# Patient Record
Sex: MALE | Race: BLACK OR AFRICAN AMERICAN | NOT HISPANIC OR LATINO | Employment: OTHER | URBAN - METROPOLITAN AREA
[De-identification: names, ages, dates, MRNs, and addresses within clinical notes are randomized per-mention and may not be internally consistent; named-entity substitution may affect disease eponyms.]

---

## 2017-07-12 ENCOUNTER — OFFICE VISIT (OUTPATIENT)
Dept: CARDIOLOGY | Facility: CLINIC | Age: 62
End: 2017-07-12
Payer: MEDICAID

## 2017-07-12 VITALS
HEIGHT: 72 IN | BODY MASS INDEX: 21.81 KG/M2 | HEART RATE: 76 BPM | WEIGHT: 161 LBS | SYSTOLIC BLOOD PRESSURE: 140 MMHG | DIASTOLIC BLOOD PRESSURE: 80 MMHG

## 2017-07-12 DIAGNOSIS — I50.32 CHRONIC DIASTOLIC HEART FAILURE: ICD-10-CM

## 2017-07-12 DIAGNOSIS — R09.89 BILATERAL CAROTID BRUITS: ICD-10-CM

## 2017-07-12 DIAGNOSIS — R26.9 ABNORMALITY OF GAIT FOLLOWING CEREBROVASCULAR ACCIDENT (CVA): ICD-10-CM

## 2017-07-12 DIAGNOSIS — I10 ESSENTIAL HYPERTENSION: ICD-10-CM

## 2017-07-12 DIAGNOSIS — I77.1 SUBCLAVIAN ARTERY STENOSIS, LEFT: Primary | ICD-10-CM

## 2017-07-12 DIAGNOSIS — I69.398 ABNORMALITY OF GAIT FOLLOWING CEREBROVASCULAR ACCIDENT (CVA): ICD-10-CM

## 2017-07-12 DIAGNOSIS — I25.10 CORONARY ARTERY DISEASE INVOLVING NATIVE CORONARY ARTERY OF NATIVE HEART WITHOUT ANGINA PECTORIS: ICD-10-CM

## 2017-07-12 DIAGNOSIS — I21.4 NSTEMI (NON-ST ELEVATED MYOCARDIAL INFARCTION): ICD-10-CM

## 2017-07-12 PROCEDURE — 99999 PR PBB SHADOW E&M-EST. PATIENT-LVL II: CPT | Mod: PBBFAC,,, | Performed by: INTERNAL MEDICINE

## 2017-07-12 PROCEDURE — 99214 OFFICE O/P EST MOD 30 MIN: CPT | Mod: S$PBB,,, | Performed by: INTERNAL MEDICINE

## 2017-07-12 PROCEDURE — 99212 OFFICE O/P EST SF 10 MIN: CPT | Mod: PBBFAC,PO | Performed by: INTERNAL MEDICINE

## 2017-07-12 NOTE — PATIENT INSTRUCTIONS
Heart Disease Education    The heart beats 60 to 100 times per minute, 24 hours a day. This equals almost 1000,000 times a day. It pumps blood with oxygen and nutrients to the tissues and organs of the body. But the heart is a muscle and needs its own supply of blood. Blood flow to the heart is supplied by the coronary arteries. Coronary artery disease (atherosclerosis) is a result of cholesterol, saturated fat, and calcium deposits (plaques) that build up inside the walls. This causes inflammation within the coronary arteries. These plaques narrow the artery and reduce blood flow to the heart muscle. The reduction in blood flow to the heart muscle decreases oxygen supply to the heart. If the narrowing is significant enough, the oxygen supply to one or more regions of the heart can be temporarily or permanently shut down. This can cause chest pain, and possibly death of heart tissue (heart attack).  Types of chest pain  Angina is the name for pain in the heart muscle. Angina is a warning sign of serious heart disease. When untreated it can lead to a heart attack, also known as acute myocardial infarction, or AMI. Angina occurs when there is not enough blood and oxygen flowing to the heart for the amount of work it is doing. This most often happens during physical exertion, when the heart is working hardest. It is usually relieved by rest or nitroglycerin. Angina may also occur after a large meal when extra blood is sent to the digestive organs and less goes to the heart. In the case of advanced or unstable heart disease, angina can occur at rest or awaken you from sleep. Angina usually lasts from a few minutes up to 20 minutes or more. When treated early, the effects of angina can be reversed without permanent damage to the heart. Angina is a serious condition and needs to be evaluated by a medical professional immediately.  There are two types of angina -- stable and unstable:  · Stable angina usually occurs  with a predictable level of activity. Being stable, its character, severity, and occurrence do not change much over time. It usually starts with activity, and resolves with rest or taking your medicine as instructed by your doctor. The symptoms usually do not last long.  · Unstable angina changes or gets worse over time. It is different from whatever you are used to. It may feel different or worse, begin without cause, occur with exercise or exertion, wake you up from sleep, and last longer. It may not respond in the same way as it does when you take your usual medicines for an attack. This type of angina can be a warning sign of an impending heart attack.     A heart attack is usually the result of a blood clot that suddenly forms in a coronary artery that has been narrowed with plaque. When this occurs, blood flow may be cut off to a part of the heart muscle, causing the cells to die. This weakens the pumping action of the heart, which affects the delivery of blood to all the other organs in the body including the brain. This damage is not reversible. However, early treatment can limit the amount of damage.  The pain you feel with angina and a heart attack may have a similar quality. However, it is usually different in intensity and duration. Here are some typical descriptions of a heart attack:  · It is most often experienced as a squeezing, crushing, pressure-like sensation in the center of the chest.  · It is sometimes described as something heavy sitting on my chest.  · It may feel more like a bad case of indigestion.  · The pain may spread from the chest to the arm, shoulder, throat or jaw.  · Sometimes the pain is not felt in the chest at all, but only in the arm, shoulder, throat or jaw.  · There may also be nausea, vomiting, dizziness or light-headedness, sweating and trouble breathing.  · Palpitations, or your heart beating rapidly  · A new, irregular heart beat  · Unexplained weakness  You may not be  "able to tell the difference between "bad" angina and a heart attack at home. Seek help if your symptoms are different than usual. Do not be in denial or just try to "tough it out."  Call 911  This is the fastest and safest way to get to the emergency department. The paramedics can also start treatment on the way to the hospital, saving valuable time for your heart.  · If the angina gets worse, if it continues, or if it stops and returns, call 911 immediately. Do not delay. You may be having a heart attack.  · After you call 911, take a second tablet or spray unless instructed otherwise. When repeating doses, sit down if possible, because it can make you feel lightheaded or dizzy. Wait another 5 minutes. If the angina still does not go away, take a third tablet or spray. Do not take more than 3 tablets or sprays within 15 minutes. Stay on the phone with 911 for further instruction.  · Your healthcare provider may give you slightly different instructions than those above. If so, follow them carefully.  Do not wait until symptoms become severe to call 911.  Other reasons to call 911 include:  · Trouble breathing  · Feeling lightheaded, faint, or dizzy  · Rapid heart beat  · Slower than usual heart rate compared to your normal  · Angina with weakness, dizziness, fainting, heavy sweating, nausea, or vomiting  · Extreme drowsiness, confusion  · Weakness of an arm or leg or one side of the face  · Difficulty with speech or vision  When to seek medical care  Remember, the signs and symptoms of a heart attack are not always like they are on TV. Sometimes they are not so obvious. You may only feel weak, or just not right. If it is not clear or if you have any doubt, call for advice.  · Seek help if there is a change in the type of pain, if it feels different, or if your symptoms are mild.  · Do not drive yourself. Have someone else drive you. If no one can drive, call 911.  · Do not delay. Fast diagnosis and treatment can " "prevent or limit the amount of heart damage during a heart attack.  · Do not go to your doctor's office or a clinic as they may not be able to provide all the testing and treatment required for this condition.  · If your doctor has given you medicine to take when symptoms occur, take them but don't delay getting help trying to locate medicines.  What happens in the emergency department  The emergency department is connected to your local emergency medical system (EMS) through 911. That's why during a cardiac emergency, calling 911 is the fastest way to get help. The goal of the emergency department is to rapidly screen, evaluate, and treat people.  Once you are there, an electrocardiogram (ECG or heart tracing) will be done. Blood samples may be taken to look for the presence of heart enzymes that leak from damaged heart cells and show if a heart attack is occurring. You will often be evaluated by a heart specialist (cardiologist) who decides the best course of action. In the case of severe angina or early heart attack, and depending on the circumstances, powerful "clot busting" medicines can be used to dissolve blood clots in the coronary artery. In other cases, you may be taken to a cardiac catheterization lab. Here, a tiny balloon-tipped catheter is advanced through blood vessels to the heart. There the balloon is inflated pushing open the blood vessel restoring blood flow.  Risk factors for heart disease  Risk factors for heart disease are a combination of genetic and lifestyle. Many risk factors work by either directly or indirectly damaging the blood vessels of the heart, or by increasing the risk of forming blood or cholesterol clots, which then clog up and block the arteries.     Examples of physical lifestyle risk factors:  · Cigarette smoking  · High blood pressure  · High blood cholesterol  · Use of stimulant drugs such as cocaine, crack, and amphetamines  · Eating a high-fat, high-cholesterol " meal  · Diabetes   · Obesity which increases risk for diabetes and high blood pressure  · Lack of regular physical activity     Examples of emotional lifestyle factors:  · Chronic high stress levels release stress hormones. These raise blood pressure and cholesterol level and makes blood clot more easily.  · Held-in anger, hostile or cynical attitude  · Social and emotional isolation, lack of intimacy  · Loss of relationship  · Depression  Other factors that increase the risk of heart attack that you cannot control :  · Age. The older you get beyond 40, the greater is your risk of significant coronary artery disease.  · Gender. More men than women get heart disease; but once past menopause, women who are not taking estrogen replacement have the same risk as men for a heart attack.  · Family history. If your mother, father, brother or sister has coronary artery disease, your risk of having it is higher than a person your age without this family history.  What can you do to decrease your risk  To reduce your risk of heart disease:  · Get regular checkups with your doctor.  · Take your medicines for blood pressure, cholesterol or diabetes as directed.  · Watch your diet. Eat a heart healthy diet choosing fresh foods, less salt, cholesterol, and fat  · Stop smoking. Get help if needed.  · Get regular exercise.  · Manage stress.  · Carry a list of medicines and doses in your wallet.  Date Last Reviewed: 12/30/2015  © 5363-4508 [employer]. [address] [place] [address], [place] [address]. All rights reserved. This information is not intended as a substitute for professional medical care. Always follow your healthcare professional's instructions.

## 2017-07-12 NOTE — PROGRESS NOTES
Subjective:   Patient ID:  Pee Bocanegra is a 62 y.o. male who presents for follow up of Coronary Artery Disease; Hyperlipidemia; Hypertension; and Shortness of Breath      HPI:       He is today feeling well but continue to have intermittent dyspnea with exertion. No weight gain. Sometimes his symptoms improve after taking an extra dose of lasix 20 mg. His last admission for CHF decompensation was 11/2019/2016 for dietary indiscretion. He is on aspirin and plavix for DAPT. He has L subclavian stenosis with 20-30 mmHg gradient without any symptoms. He has bilateral SFA and AT CTOs without claudication. 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 He walks with a cane. No ulcers.             cath report:        LM patent, LAD patent, D1 diffusely dz, prox & mid LCX 95% , mid 95% , and RCA .    SVG-OM with mid 95%, SVG-D1 occluded, and SVG-PDA occluded.    EF 45% with inferior wall hypokinesis and LVEDP 40 mmHg.    PCI of proximal LCX with 3.5 x 18 mm Resolute and mid LCX with 2.75 x 30 mm Resolute CAMILA.    No intervention of SVG-OM as it supplies a small territory.    The native LCX provides flow to the territory distal to SVG-OM anastomosis.             Patient Active Problem List    Diagnosis Date Noted    Bilateral carotid bruits 07/12/2017    Subclavian artery stenosis, left 11/17/2016         30 mmHg gradient   asymptomatic      Chronic diastolic heart failure 11/04/2016     -TTE (11/3/2016) Low normal to mildly depressed LVEF 50-55%; diastolic dysfunction; moderate LAE; mild AI/MR      PAD (peripheral artery disease) 11/04/2016         Bilateral SFA       NSTEMI (non-ST elevated myocardial infarction) 11/03/2016    Coronary artery disease involving native coronary artery of native heart without angina pectoris 11/03/2016     -NSTEMI  -[facility] (11/3/2016) LVEDP 40 mmHg; LVEF 50% mild  Hypokinesis to inferior wall; LM LIs; LAD LIs; pLCX 95%; mLCX 95%; RCA ; SVG-OM1 95%;  SVG-D1 occluded; SVG-RCA occluded   pLCX 3.5x18 Resolute (CAMILA) and mLCS 2.75 x30 Resolute (CAMILA)                              Essential hypertension 2016    Other and unspecified hyperlipidemia 2016    Tobacco abuse, in remission 2016         Quit 2016      Muscle weakness of lower extremity 2016    Hamstring tightness of both lower extremities 2016    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 2016    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   HENT: Negative for congestion.    Eyes: Negative for blurred vision, discharge and double vision.   Cardiovascular: Negative for chest pain, claudication, cyanosis, dyspnea on exertion, irregular heartbeat, leg swelling, near-syncope, orthopnea, palpitations, paroxysmal nocturnal dyspnea and syncope.   Respiratory: Negative for cough, shortness of breath and wheezing.    Endocrine: Negative for cold intolerance, heat intolerance and polyphagia.   Hematologic/Lymphatic: Negative for adenopathy and bleeding problem. Does not bruise/bleed easily.   Skin: Negative for dry skin and nail changes.   Musculoskeletal: Negative for arthritis, back pain, falls, joint pain, myalgias and neck pain.   Gastrointestinal: Negative for bloating, abdominal pain, change in bowel habit and constipation.   Genitourinary: Negative for bladder incontinence, dysuria, flank pain,  genital sores and missed menses.   Neurological: Negative for aphonia, brief paralysis, difficulty with concentration and dizziness.   Psychiatric/Behavioral: Negative for altered mental status and memory loss. The patient does not have insomnia.    Allergic/Immunologic: Negative for environmental allergies.       Objective:   Physical Exam   Constitutional: He is oriented to person, place, and time. He appears well-developed and well-nourished. He is not intubated.   HENT:   Head: Normocephalic and atraumatic.   Right Ear: External ear normal.   Left Ear: External ear normal.   Mouth/Throat: Oropharynx is clear and moist.   Eyes: Conjunctivae and EOM are normal. Pupils are equal, round, and reactive to light. Right eye exhibits no discharge. Left eye exhibits no discharge. No scleral icterus.   Neck: Normal range of motion. Neck supple. Normal carotid pulses, no hepatojugular reflux and no JVD present. Carotid bruit is not present. No tracheal deviation present. No thyromegaly present.   Cardiovascular: Normal rate, regular rhythm, S1 normal and S2 normal.   No extrasystoles are present. PMI is not displaced.  Exam reveals no gallop, no S3, no distant heart sounds, no friction rub and no midsystolic click.    No murmur heard.  Pulses:       Carotid pulses are 2+ on the right side, and 2+ on the left side.       Radial pulses are 2+ on the right side, and 2+ on the left side.        Femoral pulses are 2+ on the right side, and 2+ on the left side.       Popliteal pulses are 2+ on the right side, and 2+ on the left side.        Dorsalis pedis pulses are 0 on the right side, and 0 on the left side.        Posterior tibial pulses are 0 on the right side, and 0 on the left side.     Monophasic bilateral DP doppler signals  Triphasic L PT and Biphasic R PT doppler signals    Pulmonary/Chest: Effort normal and breath sounds normal. No accessory muscle usage or stridor. No apnea, no tachypnea and no bradypnea. He is not  intubated. No respiratory distress. He has no decreased breath sounds. He has no wheezes. He has no rales. He exhibits no tenderness and no bony tenderness.   Abdominal: He exhibits no distension, no pulsatile liver, no abdominal bruit, no ascites, no pulsatile midline mass and no mass. There is no tenderness. There is no rebound and no guarding.   Musculoskeletal: Normal range of motion. He exhibits no edema or tenderness.   Lymphadenopathy:     He has no cervical adenopathy.   Neurological: He is alert and oriented to person, place, and time. He has normal reflexes. No cranial nerve deficit. Coordination normal.   Chronic L sided weakness   Skin: Skin is warm. No rash noted. No erythema. No pallor.   Psychiatric: He has a normal mood and affect. His behavior is normal. Judgment and thought content normal.       Assessment:     1. Subclavian artery stenosis, left    2. NSTEMI (non-ST elevated myocardial infarction)    3. Coronary artery disease involving native coronary artery of native heart without angina pectoris    4. Essential hypertension    5. Chronic diastolic heart failure    6. 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 Bilateral carotid bruits        Plan:         Continue with current medical plan and lifestyle changes.  Return sooner for concerns or questions. If symptoms persist go to the ED  I have reviewed all pertinent data on this patient       Lasix 20 mg po daily   Extra dose of lasix for 2 lbs weight gain      Enroll in CHF monitoring program      Low salt diet  1.5 L fluid restriction        Consider increasing CHF medications or even adding aldactone for refractory symptoms  Rule out ischemia with PET stress in view of RCA         Follow up as scheduled. Return sooner for concerns or questions            He expressed verbal understanding and agreed with the plan        Patient's Medications   New Prescriptions    No medications on file   Previous Medications     ACETAMINOPHEN-CODEINE 300-30MG (TYLENOL #3) 300-30 MG TAB    Take by mouth.    ASPIRIN (ECOTRIN) 81 MG EC TABLET    Take 1 tablet (81 mg total) by mouth once daily.    ATORVASTATIN (LIPITOR) 80 MG TABLET    Take 1 tablet (80 mg total) by mouth once daily.    CARVEDILOL (COREG) 12.5 MG TABLET    Take 12.5 mg by mouth once daily.    CLOPIDOGREL (PLAVIX) 75 MG TABLET    Take 1 tablet (75 mg total) by mouth once daily.    FUROSEMIDE (LASIX) 20 MG TABLET    Take 1 tablet (20 mg total) by mouth 2 (two) times daily.    LISINOPRIL 10 MG TABLET    Take 1 tablet (10 mg total) by mouth once daily.    NITROGLYCERIN (NITROSTAT) 0.4 MG SL TABLET    Place 1 tablet (0.4 mg total) under the tongue every 5 (five) minutes as needed for Chest pain.    TAMSULOSIN (FLOMAX) 0.4 MG CP24    Take 0.4 mg by mouth once daily.   Modified Medications    No medications on file   Discontinued Medications    No medications on file

## 2017-07-25 ENCOUNTER — HOSPITAL ENCOUNTER (EMERGENCY)
Facility: HOSPITAL | Age: 62
Discharge: HOME | End: 2017-07-25
Attending: EMERGENCY MEDICINE
Payer: MEDICAID

## 2017-07-25 VITALS
DIASTOLIC BLOOD PRESSURE: 85 MMHG | OXYGEN SATURATION: 97 % | HEIGHT: 72 IN | WEIGHT: 161 LBS | SYSTOLIC BLOOD PRESSURE: 139 MMHG | TEMPERATURE: 98 F | RESPIRATION RATE: 20 BRPM | HEART RATE: 82 BPM | BODY MASS INDEX: 21.81 KG/M2

## 2017-07-25 DIAGNOSIS — L72.3 INFECTED SEBACEOUS CYST: Primary | ICD-10-CM

## 2017-07-25 DIAGNOSIS — L08.9 INFECTED SEBACEOUS CYST: Primary | ICD-10-CM

## 2017-07-25 DIAGNOSIS — L03.317 CELLULITIS OF BUTTOCK, LEFT: ICD-10-CM

## 2017-07-25 PROCEDURE — 10060 I&D ABSCESS SIMPLE/SINGLE: CPT

## 2017-07-25 PROCEDURE — 25000003 PHARM REV CODE 250: Performed by: EMERGENCY MEDICINE

## 2017-07-25 PROCEDURE — 96372 THER/PROPH/DIAG INJ SC/IM: CPT

## 2017-07-25 PROCEDURE — S0077 INJECTION, CLINDAMYCIN PHOSP: HCPCS | Performed by: EMERGENCY MEDICINE

## 2017-07-25 PROCEDURE — 99283 EMERGENCY DEPT VISIT LOW MDM: CPT | Mod: 25

## 2017-07-25 PROCEDURE — 25000003 PHARM REV CODE 250

## 2017-07-25 RX ORDER — CLINDAMYCIN HYDROCHLORIDE 300 MG/1
300 CAPSULE ORAL 4 TIMES DAILY
Qty: 40 CAPSULE | Refills: 0 | Status: SHIPPED | OUTPATIENT
Start: 2017-07-25 | End: 2017-08-04

## 2017-07-25 RX ORDER — LIDOCAINE HYDROCHLORIDE AND EPINEPHRINE 20; 10 MG/ML; UG/ML
1 INJECTION, SOLUTION INFILTRATION; PERINEURAL
Status: COMPLETED | OUTPATIENT
Start: 2017-07-25 | End: 2017-07-25

## 2017-07-25 RX ORDER — SULFAMETHOXAZOLE AND TRIMETHOPRIM 800; 160 MG/1; MG/1
1 TABLET ORAL 2 TIMES DAILY
Qty: 20 TABLET | Refills: 0 | Status: SHIPPED | OUTPATIENT
Start: 2017-07-25 | End: 2017-08-04

## 2017-07-25 RX ORDER — LIDOCAINE HYDROCHLORIDE AND EPINEPHRINE 20; 10 MG/ML; UG/ML
INJECTION, SOLUTION INFILTRATION; PERINEURAL
Status: COMPLETED
Start: 2017-07-25 | End: 2017-07-25

## 2017-07-25 RX ORDER — OXYCODONE AND ACETAMINOPHEN 5; 325 MG/1; MG/1
1 TABLET ORAL EVERY 6 HOURS PRN
Qty: 12 TABLET | Refills: 0 | Status: SHIPPED | OUTPATIENT
Start: 2017-07-25

## 2017-07-25 RX ORDER — CLINDAMYCIN PHOSPHATE 150 MG/ML
600 INJECTION, SOLUTION INTRAVENOUS
Status: COMPLETED | OUTPATIENT
Start: 2017-07-25 | End: 2017-07-25

## 2017-07-25 RX ORDER — LIDOCAINE HYDROCHLORIDE AND EPINEPHRINE 10; 10 MG/ML; UG/ML
1 INJECTION, SOLUTION INFILTRATION; PERINEURAL
Status: DISCONTINUED | OUTPATIENT
Start: 2017-07-25 | End: 2017-07-25

## 2017-07-25 RX ADMIN — LIDOCAINE HYDROCHLORIDE,EPINEPHRINE BITARTRATE 1 ML: 20; .01 INJECTION, SOLUTION INFILTRATION; PERINEURAL at 08:07

## 2017-07-25 RX ADMIN — LIDOCAINE HYDROCHLORIDE AND EPINEPHRINE 1 ML: 20; 10 INJECTION, SOLUTION INFILTRATION; PERINEURAL at 08:07

## 2017-07-25 RX ADMIN — CLINDAMYCIN PHOSPHATE 600 MG: 150 INJECTION, SOLUTION INTRAMUSCULAR; INTRAVENOUS at 08:07

## 2017-07-25 NOTE — ED PROVIDER NOTES
Encounter Date: 7/25/2017       History     Chief Complaint   Patient presents with    Abscess     abscess to left buttock that began as an insect bite on Thursday. Reports pain 10/10      Abscess to left buttock that began as an insect bite   on Thursday. Reports pain 10/10.  No fever, no vomiting, no diarrhea          Review of patient's allergies indicates:  No Known Allergies  Past Medical History:   Diagnosis Date    Acute MI 2008 and 2016    Hypertension      Past Surgical History:   Procedure Laterality Date    CORONARY ARTERY BYPASS GRAFT       History reviewed. No pertinent family history.  Social History   Substance Use Topics    Smoking status: Former Smoker     Packs/day: 0.50    Smokeless tobacco: Never Used    Alcohol use Yes      Comment: occ     Review of Systems   Constitutional: Negative for fever.   HENT: Negative for sore throat.    Respiratory: Negative for shortness of breath.    Cardiovascular: Negative for chest pain.   Gastrointestinal: Negative for nausea.   Genitourinary: Negative for dysuria.   Musculoskeletal: Negative for back pain.   Skin: Negative for rash.   Neurological: Negative for weakness.   Hematological: Does not bruise/bleed easily.   All other systems reviewed and are negative.      Physical Exam     Initial Vitals [07/25/17 0824]   BP Pulse Resp Temp SpO2   (!) 145/85 83 20 98.8 °F (37.1 °C) 97 %      MAP       105         Physical Exam    Nursing note and vitals reviewed.  Constitutional: He appears well-developed and well-nourished.   HENT:   Head: Normocephalic and atraumatic.   Eyes: Conjunctivae and EOM are normal. Pupils are equal, round, and reactive to light.   Neck: Normal range of motion. Neck supple.   Cardiovascular: Normal rate and regular rhythm. Exam reveals no gallop and no friction rub.    No murmur heard.  Pulmonary/Chest: Breath sounds normal. He has no wheezes. He has no rales.   Abdominal: Soft. There is no tenderness. There is no guarding.    Musculoskeletal: Normal range of motion.   Neurological: He is alert and oriented to person, place, and time. He has normal strength.   Skin: Abscess noted. There is erythema.        Psychiatric: He has a normal mood and affect. Thought content normal.         ED Course   I & D - Incision and Drainage  Date/Time: 7/25/2017 10:14 AM  Location procedure was performed: [facility] EMERGENCY DEPARTMENT  Performed by: MONIQUE DAI  Authorized by: MONIQUE DAI   Consent Done: Not Needed  Type: cyst  Body area: lower extremity  Location details: left buttock  Anesthesia: local infiltration    Anesthesia:  Local Anesthetic: lidocaine 2% with epinephrine  Anesthetic total: 8 mL  Patient sedated: no  Scalpel size: 11  Incision type: single straight  Complexity: simple  Drainage: purulent  Drainage amount: copious  Wound treatment: deloculation,  expression of material,  sebum removal and  wound packed  Packing material: 1/2 in gauze  Complications: No  Estimated blood loss (mL): 3  Specimens: No  Implants: No  Patient tolerance: Patient tolerated the procedure well with no immediate complications        Labs Reviewed - No data to display         I discussed wound care precautions with patient and/or family/caretaker; specifically that all wounds have risk of infection despite efforts to cleanse and debride the wound; and there is a risk of an occult foreign body (and thus increased risk of infection) despite a negative examination.  I discussed with patient need to return for any signs of infection, specifically redness, increased pain, fever, drainage of pus, or any concern, immediately.    I have a low suspicion for medical, surgical or other life threatening illness and I believe patient is safe for discharge.  I specifically counseled the patient and/or family/caretaker that despite an unrevealing evaluation in the ED, patient may still be at risk for serious, even life threatening illness.  I have attempted to answer  all questions related to her stay today.  I have given the patient explicit instructions to return immediately for any worsening or change in current symptoms, or for any concern.                       ED Course     Clinical Impression:   The primary encounter diagnosis was Infected sebaceous cyst. A diagnosis of Cellulitis of buttock, left was also pertinent to this visit.                           Jose Gunn MD  07/25/17 1016

## 2017-07-29 ENCOUNTER — HOSPITAL ENCOUNTER (EMERGENCY)
Facility: HOSPITAL | Age: 62
Discharge: HOME | End: 2017-07-29
Attending: EMERGENCY MEDICINE
Payer: MEDICAID

## 2017-07-29 VITALS
BODY MASS INDEX: 22.21 KG/M2 | DIASTOLIC BLOOD PRESSURE: 73 MMHG | TEMPERATURE: 98 F | OXYGEN SATURATION: 99 % | HEART RATE: 74 BPM | WEIGHT: 164 LBS | SYSTOLIC BLOOD PRESSURE: 131 MMHG | HEIGHT: 72 IN | RESPIRATION RATE: 18 BRPM

## 2017-07-29 DIAGNOSIS — Z51.89 WOUND CHECK, ABSCESS: Primary | ICD-10-CM

## 2017-07-29 PROCEDURE — 99282 EMERGENCY DEPT VISIT SF MDM: CPT

## 2017-07-29 NOTE — ED PROVIDER NOTES
Encounter Date: 7/29/2017       History     Chief Complaint   Patient presents with    Abscess     Pt states had I&D  Monday and 2 days ago rechecked  at PCP office and told to come to ED today for recheck and to have packing removed.      Pleasant 62-year-old male status post [place]  comes emergency room with complaints of abscess to the left cheek of his buttocks laterally.  Patient was seen by primary care physician had an I&D performed.  Was told to come back in 2 days' time for further evaluation of the wound removal of the packing.  Patient presents afebrile.  Minimal complaints of pain.  States wound is still draining.  Markedly decreased amount of pain.  Stable otherwise.          Review of patient's allergies indicates:  No Known Allergies  Past Medical History:   Diagnosis Date    Acute MI 2008 and 2016    Hypertension      Past Surgical History:   Procedure Laterality Date    CORONARY ARTERY BYPASS GRAFT       History reviewed. No pertinent family history.  Social History   Substance Use Topics    Smoking status: Former Smoker     Packs/day: 0.50    Smokeless tobacco: Never Used    Alcohol use Yes      Comment: occ     Review of Systems   Constitutional: Negative.  Negative for fever.   HENT: Negative.    Respiratory: Negative.    Cardiovascular: Negative.    Gastrointestinal: Negative.    Musculoskeletal: Positive for gait problem ( Mild dentist palpation of the left leg with ambulation.  Secondary to abscess.).   Skin: Positive for wound ( Abscess with packing intact left lateral buttocks cheek.).   Hematological: Negative.    All other systems reviewed and are negative.      Physical Exam     Initial Vitals [07/29/17 0918]   BP Pulse Resp Temp SpO2   131/73 74 18 97.8 °F (36.6 °C) 99 %      MAP       92.33         Physical Exam    Nursing note and vitals reviewed.  Constitutional: He appears well-developed and well-nourished.   HENT:   Head: Normocephalic.   Eyes: Pupils are equal,  round, and reactive to light.   Cardiovascular: Normal rate, regular rhythm and normal heart sounds.   Abdominal: Soft.   Musculoskeletal: Normal range of motion.   Neurological: He is alert and oriented to person, place, and time. He has normal strength.   Skin:   Abscess left lateral buttocks cheek.  Packing intact with mild purulent drainage noted.  Very minimal erythema.  Minimal swelling.  No foul smell.  Packing removed refill healing abscess void.   Psychiatric: He has a normal mood and affect. His behavior is normal. Judgment and thought content normal.         ED Course   Procedures  Labs Reviewed - No data to display          Medical Decision Making:   Differential Diagnosis:   Reabscess formation.  Insect bite.  Deep space infection.  Wound change.  ED Management:  The packing was removed revealed a healing abscess of the left lateral buttocks cheek.  Irrigated copiously with normal saline.  The corner edge of a 4 x 4 was packed back into the wound and then added upon his dressing.  Several of the 4 x 4's were packed on top of this.  Patient discharged home at this time.  Continue antibiotic therapy.  Follow-up this Monday or Tuesday with his primary care physician.  Patient clearly understands to return to the emergency room any fevers or worsening of this abscess.                   ED Course     Clinical Impression:   The encounter diagnosis was Wound check, abscess.    Disposition:   Disposition: Discharged  Condition: Stable                        Craig Alvarado MD  07/29/17 1091       Craig Alvarado MD  07/29/17 9639

## 2017-08-01 ENCOUNTER — TELEPHONE (OUTPATIENT)
Dept: CARDIOLOGY | Facility: HOSPITAL | Age: 62
End: 2017-08-01

## 2017-08-01 DIAGNOSIS — I20.89 ANGINA EFFORT: Primary | ICD-10-CM

## 2017-08-16 ENCOUNTER — HOSPITAL ENCOUNTER (OUTPATIENT)
Dept: CARDIOLOGY | Facility: HOSPITAL | Age: 62
Discharge: HOME | End: 2017-08-16
Attending: INTERNAL MEDICINE
Payer: MEDICAID

## 2017-08-16 ENCOUNTER — HOSPITAL ENCOUNTER (OUTPATIENT)
Dept: RADIOLOGY | Facility: HOSPITAL | Age: 62
Discharge: HOME | End: 2017-08-16
Attending: INTERNAL MEDICINE
Payer: MEDICAID

## 2017-08-16 DIAGNOSIS — I20.89 ANGINA EFFORT: ICD-10-CM

## 2017-08-16 LAB — DIASTOLIC DYSFUNCTION: NO

## 2017-08-16 PROCEDURE — 93016 CV STRESS TEST SUPVJ ONLY: CPT | Mod: ,,, | Performed by: INTERNAL MEDICINE

## 2017-08-16 PROCEDURE — 78452 HT MUSCLE IMAGE SPECT MULT: CPT | Mod: TC,PO

## 2017-08-16 PROCEDURE — 93018 CV STRESS TEST I&R ONLY: CPT | Mod: ,,, | Performed by: INTERNAL MEDICINE

## 2017-08-16 RX ORDER — REGADENOSON 0.08 MG/ML
INJECTION, SOLUTION INTRAVENOUS
Status: DISPENSED
Start: 2017-08-16 | End: 2017-08-16

## 2017-09-05 ENCOUNTER — HOSPITAL ENCOUNTER (EMERGENCY)
Facility: HOSPITAL | Age: 62
Discharge: HOME | End: 2017-09-05
Attending: FAMILY MEDICINE
Payer: MEDICAID

## 2017-09-05 VITALS
OXYGEN SATURATION: 99 % | RESPIRATION RATE: 18 BRPM | DIASTOLIC BLOOD PRESSURE: 89 MMHG | SYSTOLIC BLOOD PRESSURE: 183 MMHG | HEART RATE: 78 BPM | TEMPERATURE: 98 F

## 2017-09-05 DIAGNOSIS — S46.911A RIGHT SHOULDER STRAIN, INITIAL ENCOUNTER: Primary | ICD-10-CM

## 2017-09-05 DIAGNOSIS — M25.511 SHOULDER PAIN, RIGHT: ICD-10-CM

## 2017-09-05 PROCEDURE — 99283 EMERGENCY DEPT VISIT LOW MDM: CPT | Mod: 25

## 2017-09-05 PROCEDURE — 63600175 PHARM REV CODE 636 W HCPCS: Performed by: FAMILY MEDICINE

## 2017-09-05 PROCEDURE — 96372 THER/PROPH/DIAG INJ SC/IM: CPT

## 2017-09-05 RX ORDER — MORPHINE SULFATE 2 MG/ML
4 INJECTION, SOLUTION INTRAMUSCULAR; INTRAVENOUS
Status: COMPLETED | OUTPATIENT
Start: 2017-09-05 | End: 2017-09-05

## 2017-09-05 RX ADMIN — MORPHINE SULFATE 4 MG: 2 INJECTION, SOLUTION INTRAMUSCULAR; INTRAVENOUS at 07:09

## 2017-09-05 NOTE — ED PROVIDER NOTES
"Encounter Date: 9/5/2017       History     Chief Complaint   Patient presents with    Fall     pt reports "I fell off of the  a few days ago and it flipped on top of me." Pain has progressively worsened x a few days. + right arm, neck, and back pain.     Patient says he was cutting grass on Thursday when he slipped and fell off the lawnmower onto his right side but again he says the lawnmower fell on his right side which does not make sense.  He said he landed on his right shoulder area.  It was not hurting him much last 2 days and he was taking oxycodone as prescribed by his PCP on regular basis patient says the pain has increased since last night even after him taking his pain medication.  Sometimes he feels numbness in his forearm while raising the arm up and moving around.  There is no decrease in strength.  Patient had normal movements and elbow and hand.  There are no external lacerations, bruising.      The history is provided by the patient.     Review of patient's allergies indicates:  No Known Allergies  Past Medical History:   Diagnosis Date    Acute MI 2008 and 2016    Hypertension      Past Surgical History:   Procedure Laterality Date    CORONARY ARTERY BYPASS GRAFT       No family history on file.  Social History   Substance Use Topics    Smoking status: Former Smoker     Packs/day: 0.50    Smokeless tobacco: Never Used    Alcohol use Yes      Comment: occ     Review of Systems   Constitutional: Negative for activity change, appetite change, chills, fatigue and fever.   HENT: Negative for congestion, ear discharge, ear pain and sinus pressure.    Eyes: Negative for pain, discharge, redness and itching.   Respiratory: Negative for cough, chest tightness, shortness of breath and wheezing.    Cardiovascular: Negative for chest pain and palpitations.   Gastrointestinal: Negative for abdominal pain, diarrhea, nausea and vomiting.   Musculoskeletal: Negative for gait problem.   Skin: " Negative for rash.   Neurological: Negative for dizziness and light-headedness.   Psychiatric/Behavioral: Negative for confusion. The patient is not nervous/anxious.    All other systems reviewed and are negative.      Physical Exam     Initial Vitals   BP Pulse Resp Temp SpO2   -- -- -- -- --      MAP       --         Physical Exam    Nursing note and vitals reviewed.  Constitutional: He appears well-developed and well-nourished.   HENT:   Head: Normocephalic.   Nose: Nose normal.   Mouth/Throat: Oropharynx is clear and moist.   Eyes: Conjunctivae and EOM are normal. Pupils are equal, round, and reactive to light.   Neck: Normal range of motion. Neck supple.   Cardiovascular: Normal rate and regular rhythm.   Pulmonary/Chest: Breath sounds normal. No respiratory distress. He has no wheezes. He has no rhonchi. He has no rales. He exhibits no tenderness.   Abdominal: Soft. Bowel sounds are normal. He exhibits no distension. There is no tenderness.   Musculoskeletal:        Right shoulder: He exhibits tenderness and pain. He exhibits normal range of motion, no bony tenderness, no swelling, no effusion, no deformity, normal pulse and normal strength.        Arms:  Pain is limiting from raising right arm above the shoulder level.  Pain in tenderness localized to right upper shoulder on posterior aspect.  There is no deformity or swelling.   Neurological: He is alert and oriented to person, place, and time. He has normal strength and normal reflexes. He displays normal reflexes. No cranial nerve deficit or sensory deficit.   Skin: Skin is warm. Capillary refill takes less than 2 seconds.         ED Course   Procedures  Labs Reviewed - No data to display                            ED Course      Clinical Impression:   The primary encounter diagnosis was Right shoulder strain, initial encounter. A diagnosis of Shoulder pain, right was also pertinent to this visit.                           Kirby Braun MD  09/06/17  0232

## 2017-09-05 NOTE — ED TRIAGE NOTES
"pt reports "I fell off of the  a few days ago and it flipped on top of me." Pain has progressively worsened x a few days. + right arm, neck, and back pain.  "

## 2017-09-20 ENCOUNTER — TELEPHONE (OUTPATIENT)
Dept: CARDIOLOGY | Facility: HOSPITAL | Age: 62
End: 2017-09-20

## 2017-09-20 NOTE — PROGRESS NOTES
Your stress test was abnormal         We can discuss the next step        Unless you are doing well we can continue with medications for now        We can discuss this matter during your follow up        Thanks        ZN

## 2017-10-25 ENCOUNTER — OFFICE VISIT (OUTPATIENT)
Dept: CARDIOLOGY | Facility: CLINIC | Age: 62
End: 2017-10-25
Payer: MEDICAID

## 2017-10-25 VITALS
WEIGHT: 165 LBS | HEART RATE: 72 BPM | HEIGHT: 73 IN | DIASTOLIC BLOOD PRESSURE: 74 MMHG | BODY MASS INDEX: 21.87 KG/M2 | SYSTOLIC BLOOD PRESSURE: 140 MMHG

## 2017-10-25 DIAGNOSIS — I77.1 SUBCLAVIAN ARTERY STENOSIS, LEFT: ICD-10-CM

## 2017-10-25 DIAGNOSIS — I10 ESSENTIAL HYPERTENSION: ICD-10-CM

## 2017-10-25 DIAGNOSIS — R26.9 ABNORMALITY OF GAIT FOLLOWING CEREBROVASCULAR ACCIDENT (CVA): ICD-10-CM

## 2017-10-25 DIAGNOSIS — R94.39 ABNORMAL CARDIOVASCULAR STRESS TEST: Primary | ICD-10-CM

## 2017-10-25 DIAGNOSIS — I69.398 ABNORMALITY OF GAIT FOLLOWING CEREBROVASCULAR ACCIDENT (CVA): ICD-10-CM

## 2017-10-25 DIAGNOSIS — I73.9 PAD (PERIPHERAL ARTERY DISEASE): ICD-10-CM

## 2017-10-25 DIAGNOSIS — I25.10 CORONARY ARTERY DISEASE INVOLVING NATIVE CORONARY ARTERY OF NATIVE HEART WITHOUT ANGINA PECTORIS: ICD-10-CM

## 2017-10-25 DIAGNOSIS — R26.89 IMPAIRED BALANCE AS LATE EFFECT OF CEREBROVASCULAR ACCIDENT: ICD-10-CM

## 2017-10-25 DIAGNOSIS — I69.398 IMPAIRED BALANCE AS LATE EFFECT OF CEREBROVASCULAR ACCIDENT: ICD-10-CM

## 2017-10-25 PROCEDURE — 99215 OFFICE O/P EST HI 40 MIN: CPT | Mod: S$PBB,,, | Performed by: INTERNAL MEDICINE

## 2017-10-25 PROCEDURE — 99999 PR PBB SHADOW E&M-EST. PATIENT-LVL II: CPT | Mod: PBBFAC,,, | Performed by: INTERNAL MEDICINE

## 2017-10-25 PROCEDURE — 99212 OFFICE O/P EST SF 10 MIN: CPT | Mod: PBBFAC,PO | Performed by: INTERNAL MEDICINE

## 2017-10-25 RX ORDER — LISINOPRIL 10 MG/1
10 TABLET ORAL DAILY
Qty: 90 TABLET | Refills: 3
Start: 2017-10-25 | End: 2018-01-24 | Stop reason: SDUPTHER

## 2017-10-25 RX ORDER — CARVEDILOL 12.5 MG/1
12.5 TABLET ORAL DAILY
Qty: 180 TABLET | Refills: 3 | Status: SHIPPED | OUTPATIENT
Start: 2017-10-25 | End: 2018-01-24 | Stop reason: SDUPTHER

## 2017-10-25 RX ORDER — CLOPIDOGREL BISULFATE 75 MG/1
75 TABLET ORAL DAILY
Qty: 90 TABLET | Refills: 3 | Status: SHIPPED | OUTPATIENT
Start: 2017-10-25 | End: 2018-01-24 | Stop reason: SDUPTHER

## 2017-10-25 RX ORDER — NITROGLYCERIN 0.4 MG/1
0.4 TABLET SUBLINGUAL EVERY 5 MIN PRN
Qty: 20 TABLET | Refills: 11 | Status: SHIPPED | OUTPATIENT
Start: 2017-10-25 | End: 2018-01-24 | Stop reason: SDUPTHER

## 2017-10-25 RX ORDER — ATORVASTATIN CALCIUM 80 MG/1
80 TABLET, FILM COATED ORAL DAILY
Qty: 30 TABLET | Refills: 11 | Status: SHIPPED | OUTPATIENT
Start: 2017-10-25 | End: 2018-01-24 | Stop reason: SDUPTHER

## 2017-10-25 NOTE — PROGRESS NOTES
Subjective:   Patient ID:  Pee Bocanegra is a 62 y.o. male who presents for follow up of Coronary Artery Disease; Hyperlipidemia; Hypertension; Abnormal Stress Test; abnormal EF; and asymptomatic L subclavian artery disease      HPI:       He is today for follow up of an abnormal nuclear stress test ordered for intermittent dyspnea with exertion which improved but did not completely resolve adding lasix 20. He is compliant with his his medications. Stress test revealed anterior wall defect with an EF 39%.       His last admission for CHF decompensation was 11/2019/2016 for dietary indiscretion. He is on aspirin and plavix for DAPT. He has L subclavian stenosis with 20-30 mmHg gradient without any symptoms. He has bilateral SFA and AT CTOs without claudication. 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 He walks with a cane. No ulcers.             Cath report 11/2016        LM patent, LAD patent, D1 diffusely dz, prox & mid LCX 95% , mid 95% , and RCA .    SVG-OM with mid 95%, SVG-D1 occluded, and SVG-PDA occluded.    EF 45% with inferior wall hypokinesis and LVEDP 40 mmHg.    PCI of proximal LCX with 3.5 x 18 mm Resolute and mid LCX with 2.75 x 30 mm Resolute CAMILA.    No intervention of SVG-OM as it supplies a small territory.    The native LCX provides flow to the territory distal to SVG-OM anastomosis.             Patient Active Problem List    Diagnosis Date Noted    Abnormal cardiovascular stress test 10/25/2017             Nuclear 8/2017   Anterior wall ischemia   EF 39%           Bilateral carotid bruits 07/12/2017    Subclavian artery stenosis, left 11/17/2016         30 mmHg gradient   asymptomatic      Chronic diastolic heart failure 11/04/2016     -TTE (11/3/2016) Low normal to mildly depressed LVEF 50-55%; diastolic dysfunction; moderate LAE; mild AI/MR      PAD (peripheral artery disease) 11/04/2016         Bilateral SFA       NSTEMI (non-ST elevated myocardial  infarction) 2016    Coronary artery disease involving native coronary artery of native heart without angina pectoris 2016     -NSTEMI  -LHC (11/3/2016) LVEDP 40 mmHg; LVEF 50% mild  Hypokinesis to inferior wall; LM LIs; LAD LIs; pLCX 95%; mLCX 95%; RCA ; SVG-OM1 95%; SVG-D1 occluded; SVG-RCA occluded   pLCX 3.5x18 Resolute (CAMILA) and mLCS 2.75 x30 Resolute (CAMILA)                              Essential hypertension 2016    Other and unspecified hyperlipidemia 2016    Tobacco abuse, in remission 2016         Quit 2016      Muscle weakness of lower extremity 2016    Hamstring tightness of both lower extremities 2016    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 2016    Abnormality of gait following cerebrovascular accident (CVA) 2016           Right Arm BP - Sittin/72  Left Arm BP - Sittin/50        LABS    Lipid panel  Lab Results   Component Value Date    CHOL 200 (H) 2017    CHOL 155 2016     Lab Results   Component Value Date    HDL 39 (L) 2017    HDL 35 (L) 2016     Lab Results   Component Value Date    LDLCALC 138.8 2017    LDLCALC 108.0 2016     Lab Results   Component Value Date    TRIG 111 2017    TRIG 60 2016     Lab Results   Component Value Date    CHOLHDL 19.5 (L) 2017    CHOLHDL 22.6 2016            Review of Systems   Constitution: Negative for diaphoresis, weakness, night sweats, weight gain and weight loss.   HENT: Negative for congestion.    Eyes: Negative for blurred vision, discharge and double vision.   Cardiovascular: Negative for chest pain, claudication, cyanosis, dyspnea on exertion, irregular heartbeat, leg swelling, near-syncope, orthopnea, palpitations, paroxysmal nocturnal dyspnea and syncope.   Respiratory: Negative for cough, shortness of breath and wheezing.    Endocrine: Negative for cold intolerance, heat intolerance and polyphagia.    Hematologic/Lymphatic: Negative for adenopathy and bleeding problem. Does not bruise/bleed easily.   Skin: Negative for dry skin and nail changes.   Musculoskeletal: Negative for arthritis, back pain, falls, joint pain, myalgias and neck pain.   Gastrointestinal: Negative for bloating, abdominal pain, change in bowel habit and constipation.   Genitourinary: Negative for bladder incontinence, dysuria, flank pain, genital sores and missed menses.   Neurological: Negative for aphonia, brief paralysis, difficulty with concentration and dizziness.   Psychiatric/Behavioral: Negative for altered mental status and memory loss. The patient does not have insomnia.    Allergic/Immunologic: Negative for environmental allergies.       Objective:   Physical Exam   Constitutional: He is oriented to person, place, and time. He appears well-developed and well-nourished. He is not intubated.   HENT:   Head: Normocephalic and atraumatic.   Right Ear: External ear normal.   Left Ear: External ear normal.   Mouth/Throat: Oropharynx is clear and moist.   Eyes: Conjunctivae and EOM are normal. Pupils are equal, round, and reactive to light. Right eye exhibits no discharge. Left eye exhibits no discharge. No scleral icterus.   Neck: Normal range of motion. Neck supple. Normal carotid pulses, no hepatojugular reflux and no JVD present. Carotid bruit is not present. No tracheal deviation present. No thyromegaly present.   Cardiovascular: Normal rate, regular rhythm, S1 normal and S2 normal.   No extrasystoles are present. PMI is not displaced.  Exam reveals no gallop, no S3, no distant heart sounds, no friction rub and no midsystolic click.    No murmur heard.  Pulses:       Carotid pulses are 2+ on the right side, and 2+ on the left side.       Radial pulses are 2+ on the right side, and 2+ on the left side.        Femoral pulses are 2+ on the right side, and 2+ on the left side.       Popliteal pulses are 2+ on the right side, and 2+ on  the left side.        Dorsalis pedis pulses are 0 on the right side, and 0 on the left side.        Posterior tibial pulses are 0 on the right side, and 0 on the left side.     Monophasic bilateral DP doppler signals  Triphasic L PT and Biphasic R PT doppler signals    Pulmonary/Chest: Effort normal and breath sounds normal. No accessory muscle usage or stridor. No apnea, no tachypnea and no bradypnea. He is not intubated. No respiratory distress. He has no decreased breath sounds. He has no wheezes. He has no rales. He exhibits no tenderness and no bony tenderness.   Abdominal: He exhibits no distension, no pulsatile liver, no abdominal bruit, no ascites, no pulsatile midline mass and no mass. There is no tenderness. There is no rebound and no guarding.   Musculoskeletal: Normal range of motion. He exhibits no edema or tenderness.   Lymphadenopathy:     He has no cervical adenopathy.   Neurological: He is alert and oriented to person, place, and time. He has normal reflexes. No cranial nerve deficit. Coordination normal.   Chronic L sided weakness   Skin: Skin is warm. No rash noted. No erythema. No pallor.   Psychiatric: He has a normal mood and affect. His behavior is normal. Judgment and thought content normal.       Assessment:     1. Coronary artery disease involving native coronary artery of native heart without angina pectoris    2. Essential hypertension    3. PAD (peripheral artery disease)    4. Subclavian artery stenosis, left    5. Abnormality of gait following cerebrovascular accident (CVA)    6. 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 Abnormal cardiovascular stress test        Plan:         Because of CHF and abnormal stress test   He will have a LHC   iFR of LAD    Possible PCI of SVG-OM       R CFA access with 6fr sheath    Risks, benefits and alternatives of cardiac catheterization and possible intervention were discussed with the patient. All questions were answered and  informed consent obtained.     I discussed the importance of compliance with dual antiplatelet therapy with the patient to prevent acute or late stent thrombosis with premature discontinuation of the therapy.      Patient is a candidate for drug eluting stents.     Verbally the patient has expressed full understanding of the clinical importance of dual antiplatelet therapy compliance. Drug eluting stents require a minimum of 12 months of dual anti-platelet therapy.          Continue with current medical plan and lifestyle changes.  Return sooner for concerns or questions. If symptoms persist go to the ED  I have reviewed all pertinent data on this patient       Lasix 20 mg po daily   Low salt diet  1.5 L fluid restriction        Follow up as scheduled. Return sooner for concerns or questions  Call patient with the final plan        He expressed verbal understanding and agreed with the plan        I have reviewed the patient's medical history in detail and updated the computerized patient record.    Orders Placed This Encounter   Procedures    Case Request-Cath Lab: HEART CATH-LEFT     Standing Status:   Standing     Number of Occurrences:   1     Order Specific Question:   CPT Code:     Answer:   CA CATH PLACE/CORON ANGIO, IMG SUPER/INTERP,W LEFT HEART VENTRICULOGRAPHY [[id number]]       Follow up as scheduled. Return sooner for concerns or questions      Greater than 50% of the visit of 45 minutes was spent counseling, educating, and coordinating the care of the patient.  -In today's visit, at least 4 established conditions that pose a risk to life or bodily function have been addressed and the conditions are severe.    -In today's visit, monitoring for drug toxicity was accomplished.                Patient's Medications   New Prescriptions    No medications on file   Previous Medications    ACETAMINOPHEN-CODEINE 300-30MG (TYLENOL #3) 300-30 MG TAB    Take by mouth.    ASPIRIN (ECOTRIN) 81 MG EC TABLET    Take 1 tablet  (81 mg total) by mouth once daily.    FUROSEMIDE (LASIX) 20 MG TABLET    Take 1 tablet (20 mg total) by mouth 2 (two) times daily.    OXYCODONE-ACETAMINOPHEN (PERCOCET) 5-325 MG PER TABLET    Take 1 tablet by mouth every 6 (six) hours as needed for Pain.    TAMSULOSIN (FLOMAX) 0.4 MG CP24    Take 0.4 mg by mouth once daily.   Modified Medications    Modified Medication Previous Medication    ATORVASTATIN (LIPITOR) 80 MG TABLET atorvastatin (LIPITOR) 80 MG tablet       Take 1 tablet (80 mg total) by mouth once daily.    Take 1 tablet (80 mg total) by mouth once daily.    CARVEDILOL (COREG) 12.5 MG TABLET carvedilol (COREG) 12.5 MG tablet       Take 1 tablet (12.5 mg total) by mouth once daily.    Take 12.5 mg by mouth once daily.    CLOPIDOGREL (PLAVIX) 75 MG TABLET clopidogrel (PLAVIX) 75 mg tablet       Take 1 tablet (75 mg total) by mouth once daily.    Take 1 tablet (75 mg total) by mouth once daily.    LISINOPRIL 10 MG TABLET lisinopril 10 MG tablet       Take 1 tablet (10 mg total) by mouth once daily.    Take 1 tablet (10 mg total) by mouth once daily.    NITROGLYCERIN (NITROSTAT) 0.4 MG SL TABLET nitroGLYCERIN (NITROSTAT) 0.4 MG SL tablet       Place 1 tablet (0.4 mg total) under the tongue every 5 (five) minutes as needed for Chest pain.    Place 1 tablet (0.4 mg total) under the tongue every 5 (five) minutes as needed for Chest pain.   Discontinued Medications    No medications on file

## 2017-10-25 NOTE — PATIENT INSTRUCTIONS
Heart Disease Education    The heart beats 60 to 100 times per minute, 24 hours a day. This equals almost 1000,000 times a day. It pumps blood with oxygen and nutrients to the tissues and organs of the body. But the heart is a muscle and needs its own supply of blood. Blood flow to the heart is supplied by the coronary arteries. Coronary artery disease (atherosclerosis) is a result of cholesterol, saturated fat, and calcium deposits (plaques) that build up inside the walls. This causes inflammation within the coronary arteries. These plaques narrow the artery and reduce blood flow to the heart muscle. The reduction in blood flow to the heart muscle decreases oxygen supply to the heart. If the narrowing is significant enough, the oxygen supply to one or more regions of the heart can be temporarily or permanently shut down. This can cause chest pain, and possibly death of heart tissue (heart attack).  Types of chest pain  Angina is the name for pain in the heart muscle. Angina is a warning sign of serious heart disease. When untreated it can lead to a heart attack, also known as acute myocardial infarction, or AMI. Angina occurs when there is not enough blood and oxygen flowing to the heart for the amount of work it is doing. This most often happens during physical exertion, when the heart is working hardest. It is usually relieved by rest or nitroglycerin. Angina may also occur after a large meal when extra blood is sent to the digestive organs and less goes to the heart. In the case of advanced or unstable heart disease, angina can occur at rest or awaken you from sleep. Angina usually lasts from a few minutes up to 20 minutes or more. When treated early, the effects of angina can be reversed without permanent damage to the heart. Angina is a serious condition and needs to be evaluated by a medical professional immediately.  There are two types of angina -- stable and unstable:  · Stable angina usually occurs  with a predictable level of activity. Being stable, its character, severity, and occurrence do not change much over time. It usually starts with activity, and resolves with rest or taking your medicine as instructed by your doctor. The symptoms usually do not last long.  · Unstable angina changes or gets worse over time. It is different from whatever you are used to. It may feel different or worse, begin without cause, occur with exercise or exertion, wake you up from sleep, and last longer. It may not respond in the same way as it does when you take your usual medicines for an attack. This type of angina can be a warning sign of an impending heart attack.     A heart attack is usually the result of a blood clot that suddenly forms in a coronary artery that has been narrowed with plaque. When this occurs, blood flow may be cut off to a part of the heart muscle, causing the cells to die. This weakens the pumping action of the heart, which affects the delivery of blood to all the other organs in the body including the brain. This damage is not reversible. However, early treatment can limit the amount of damage.  The pain you feel with angina and a heart attack may have a similar quality. However, it is usually different in intensity and duration. Here are some typical descriptions of a heart attack:  · It is most often experienced as a squeezing, crushing, pressure-like sensation in the center of the chest.  · It is sometimes described as something heavy sitting on my chest.  · It may feel more like a bad case of indigestion.  · The pain may spread from the chest to the arm, shoulder, throat or jaw.  · Sometimes the pain is not felt in the chest at all, but only in the arm, shoulder, throat or jaw.  · There may also be nausea, vomiting, dizziness or light-headedness, sweating and trouble breathing.  · Palpitations, or your heart beating rapidly  · A new, irregular heart beat  · Unexplained weakness  You may not be  "able to tell the difference between "bad" angina and a heart attack at home. Seek help if your symptoms are different than usual. Do not be in denial or just try to "tough it out."  Call 911  This is the fastest and safest way to get to the emergency department. The paramedics can also start treatment on the way to the hospital, saving valuable time for your heart.  · If the angina gets worse, if it continues, or if it stops and returns, call 911 immediately. Do not delay. You may be having a heart attack.  · After you call 911, take a second tablet or spray unless instructed otherwise. When repeating doses, sit down if possible, because it can make you feel lightheaded or dizzy. Wait another 5 minutes. If the angina still does not go away, take a third tablet or spray. Do not take more than 3 tablets or sprays within 15 minutes. Stay on the phone with 911 for further instruction.  · Your healthcare provider may give you slightly different instructions than those above. If so, follow them carefully.  Do not wait until symptoms become severe to call 911.  Other reasons to call 911 include:  · Trouble breathing  · Feeling lightheaded, faint, or dizzy  · Rapid heart beat  · Slower than usual heart rate compared to your normal  · Angina with weakness, dizziness, fainting, heavy sweating, nausea, or vomiting  · Extreme drowsiness, confusion  · Weakness of an arm or leg or one side of the face  · Difficulty with speech or vision  When to seek medical care  Remember, the signs and symptoms of a heart attack are not always like they are on TV. Sometimes they are not so obvious. You may only feel weak, or just not right. If it is not clear or if you have any doubt, call for advice.  · Seek help if there is a change in the type of pain, if it feels different, or if your symptoms are mild.  · Do not drive yourself. Have someone else drive you. If no one can drive, call 911.  · Do not delay. Fast diagnosis and treatment can " "prevent or limit the amount of heart damage during a heart attack.  · Do not go to your doctor's office or a clinic as they may not be able to provide all the testing and treatment required for this condition.  · If your doctor has given you medicine to take when symptoms occur, take them but don't delay getting help trying to locate medicines.  What happens in the emergency department  The emergency department is connected to your local emergency medical system (EMS) through 911. That's why during a cardiac emergency, calling 911 is the fastest way to get help. The goal of the emergency department is to rapidly screen, evaluate, and treat people.  Once you are there, an electrocardiogram (ECG or heart tracing) will be done. Blood samples may be taken to look for the presence of heart enzymes that leak from damaged heart cells and show if a heart attack is occurring. You will often be evaluated by a heart specialist (cardiologist) who decides the best course of action. In the case of severe angina or early heart attack, and depending on the circumstances, powerful "clot busting" medicines can be used to dissolve blood clots in the coronary artery. In other cases, you may be taken to a cardiac catheterization lab. Here, a tiny balloon-tipped catheter is advanced through blood vessels to the heart. There the balloon is inflated pushing open the blood vessel restoring blood flow.  Risk factors for heart disease  Risk factors for heart disease are a combination of genetic and lifestyle. Many risk factors work by either directly or indirectly damaging the blood vessels of the heart, or by increasing the risk of forming blood or cholesterol clots, which then clog up and block the arteries.     Examples of physical lifestyle risk factors:  · Cigarette smoking  · High blood pressure  · High blood cholesterol  · Use of stimulant drugs such as cocaine, crack, and amphetamines  · Eating a high-fat, high-cholesterol " meal  · Diabetes   · Obesity which increases risk for diabetes and high blood pressure  · Lack of regular physical activity     Examples of emotional lifestyle factors:  · Chronic high stress levels release stress hormones. These raise blood pressure and cholesterol level and makes blood clot more easily.  · Held-in anger, hostile or cynical attitude  · Social and emotional isolation, lack of intimacy  · Loss of relationship  · Depression  Other factors that increase the risk of heart attack that you cannot control :  · Age. The older you get beyond 40, the greater is your risk of significant coronary artery disease.  · Gender. More men than women get heart disease; but once past menopause, women who are not taking estrogen replacement have the same risk as men for a heart attack.  · Family history. If your mother, father, brother or sister has coronary artery disease, your risk of having it is higher than a person your age without this family history.  What can you do to decrease your risk  To reduce your risk of heart disease:  · Get regular checkups with your doctor.  · Take your medicines for blood pressure, cholesterol or diabetes as directed.  · Watch your diet. Eat a heart healthy diet choosing fresh foods, less salt, cholesterol, and fat  · Stop smoking. Get help if needed.  · Get regular exercise.  · Manage stress.  · Carry a list of medicines and doses in your wallet.  Date Last Reviewed: 12/30/2015  © 6319-0449 [employer]. [address] [place] [address], [place] [address]. All rights reserved. This information is not intended as a substitute for professional medical care. Always follow your healthcare professional's instructions.

## 2017-10-27 ENCOUNTER — TELEPHONE (OUTPATIENT)
Dept: CARDIOLOGY | Facility: CLINIC | Age: 62
End: 2017-10-27

## 2017-10-27 RX ORDER — DIPHENHYDRAMINE HCL 25 MG
50 CAPSULE ORAL ONCE
Status: CANCELLED | OUTPATIENT
Start: 2017-10-27 | End: 2017-10-27

## 2017-10-27 NOTE — TELEPHONE ENCOUNTER
Please call him      Tell him that after discussing his case with other colleagues the conclusion was to do an angiogram to make sure his arteries are open.       Thanks        ZN

## 2017-10-30 NOTE — PRE ADMISSION SCREENING
Called and spoke w pt, pre procedure instructions given, procedure date 11/3 , arrival time 10am. Npo past mn, hold lisinopril and lasix x24hrs post procedure, have ride and caregiver avail for day of procedure. Pt verbalized understanding and denies questions.

## 2017-11-03 ENCOUNTER — HOSPITAL ENCOUNTER (OUTPATIENT)
Facility: HOSPITAL | Age: 62
Discharge: HOME | End: 2017-11-04
Attending: INTERNAL MEDICINE | Admitting: INTERNAL MEDICINE
Payer: MEDICAID

## 2017-11-03 DIAGNOSIS — R94.39 ABNORMAL CARDIOVASCULAR STRESS TEST: ICD-10-CM

## 2017-11-03 DIAGNOSIS — I25.10 CORONARY ARTERY DISEASE INVOLVING NATIVE CORONARY ARTERY OF NATIVE HEART WITHOUT ANGINA PECTORIS: ICD-10-CM

## 2017-11-03 LAB
ANION GAP SERPL CALC-SCNC: 10 MMOL/L
ANION GAP SERPL CALC-SCNC: 11 MMOL/L
BUN SERPL-MCNC: 12 MG/DL
BUN SERPL-MCNC: 14 MG/DL
CALCIUM SERPL-MCNC: 9 MG/DL
CALCIUM SERPL-MCNC: 9.3 MG/DL
CHLORIDE SERPL-SCNC: 108 MMOL/L
CHLORIDE SERPL-SCNC: 109 MMOL/L
CO2 SERPL-SCNC: 18 MMOL/L
CO2 SERPL-SCNC: 21 MMOL/L
CORONARY STENOSIS: ABNORMAL
CORONARY STENT: YES
CREAT SERPL-MCNC: 0.8 MG/DL
CREAT SERPL-MCNC: 1 MG/DL
ERYTHROCYTE [DISTWIDTH] IN BLOOD BY AUTOMATED COUNT: 13.6 %
EST. GFR  (AFRICAN AMERICAN): >60 ML/MIN/1.73 M^2
EST. GFR  (AFRICAN AMERICAN): >60 ML/MIN/1.73 M^2
EST. GFR  (NON AFRICAN AMERICAN): >60 ML/MIN/1.73 M^2
EST. GFR  (NON AFRICAN AMERICAN): >60 ML/MIN/1.73 M^2
GLUCOSE SERPL-MCNC: 83 MG/DL
GLUCOSE SERPL-MCNC: 98 MG/DL
HCT VFR BLD AUTO: 39.2 %
HGB BLD-MCNC: 12.4 G/DL
INR PPP: 1
MCH RBC QN AUTO: 27.4 PG
MCHC RBC AUTO-ENTMCNC: 31.6 G/DL
MCV RBC AUTO: 87 FL
PLATELET # BLD AUTO: 166 K/UL
PMV BLD AUTO: 12.6 FL
POTASSIUM SERPL-SCNC: 3.8 MMOL/L
POTASSIUM SERPL-SCNC: 4.1 MMOL/L
PROTHROMBIN TIME: 10.7 SEC
RBC # BLD AUTO: 4.52 M/UL
SODIUM SERPL-SCNC: 138 MMOL/L
SODIUM SERPL-SCNC: 139 MMOL/L
WBC # BLD AUTO: 5.08 K/UL

## 2017-11-03 PROCEDURE — 85610 PROTHROMBIN TIME: CPT

## 2017-11-03 PROCEDURE — 92928 PRQ TCAT PLMT NTRAC ST 1 LES: CPT | Mod: LD,,, | Performed by: INTERNAL MEDICINE

## 2017-11-03 PROCEDURE — 93458 L HRT ARTERY/VENTRICLE ANGIO: CPT | Mod: 26,59,, | Performed by: INTERNAL MEDICINE

## 2017-11-03 PROCEDURE — 99152 MOD SED SAME PHYS/QHP 5/>YRS: CPT | Mod: ,,, | Performed by: INTERNAL MEDICINE

## 2017-11-03 PROCEDURE — 63600175 PHARM REV CODE 636 W HCPCS

## 2017-11-03 PROCEDURE — 25000003 PHARM REV CODE 250: Performed by: INTERNAL MEDICINE

## 2017-11-03 PROCEDURE — 93571 IV DOP VEL&/PRESS C FLO 1ST: CPT

## 2017-11-03 PROCEDURE — 93571 IV DOP VEL&/PRESS C FLO 1ST: CPT | Mod: 26,LD,, | Performed by: INTERNAL MEDICINE

## 2017-11-03 PROCEDURE — 36415 COLL VENOUS BLD VENIPUNCTURE: CPT

## 2017-11-03 PROCEDURE — 80048 BASIC METABOLIC PNL TOTAL CA: CPT

## 2017-11-03 PROCEDURE — 25500020 PHARM REV CODE 255

## 2017-11-03 PROCEDURE — 93005 ELECTROCARDIOGRAM TRACING: CPT

## 2017-11-03 PROCEDURE — 63600175 PHARM REV CODE 636 W HCPCS: Performed by: INTERNAL MEDICINE

## 2017-11-03 PROCEDURE — 85027 COMPLETE CBC AUTOMATED: CPT

## 2017-11-03 PROCEDURE — 25000003 PHARM REV CODE 250

## 2017-11-03 RX ORDER — NITROGLYCERIN 0.4 MG/1
0.4 TABLET SUBLINGUAL EVERY 5 MIN PRN
Status: DISCONTINUED | OUTPATIENT
Start: 2017-11-03 | End: 2017-11-04 | Stop reason: HOSPADM

## 2017-11-03 RX ORDER — ONDANSETRON 2 MG/ML
4 INJECTION INTRAMUSCULAR; INTRAVENOUS ONCE
Status: COMPLETED | OUTPATIENT
Start: 2017-11-03 | End: 2017-11-03

## 2017-11-03 RX ORDER — HYDROCODONE BITARTRATE AND ACETAMINOPHEN 5; 325 MG/1; MG/1
1 TABLET ORAL EVERY 6 HOURS PRN
Status: DISCONTINUED | OUTPATIENT
Start: 2017-11-03 | End: 2017-11-04 | Stop reason: HOSPADM

## 2017-11-03 RX ORDER — TAMSULOSIN HYDROCHLORIDE 0.4 MG/1
0.4 CAPSULE ORAL DAILY
Status: DISCONTINUED | OUTPATIENT
Start: 2017-11-04 | End: 2017-11-04 | Stop reason: HOSPADM

## 2017-11-03 RX ORDER — DIPHENHYDRAMINE HCL 50 MG
50 CAPSULE ORAL ONCE
Status: DISCONTINUED | OUTPATIENT
Start: 2017-11-03 | End: 2017-11-03 | Stop reason: HOSPADM

## 2017-11-03 RX ORDER — ONDANSETRON 2 MG/ML
4 INJECTION INTRAMUSCULAR; INTRAVENOUS EVERY 4 HOURS PRN
Status: DISCONTINUED | OUTPATIENT
Start: 2017-11-03 | End: 2017-11-04 | Stop reason: HOSPADM

## 2017-11-03 RX ORDER — LISINOPRIL 10 MG/1
10 TABLET ORAL ONCE
Status: COMPLETED | OUTPATIENT
Start: 2017-11-03 | End: 2017-11-03

## 2017-11-03 RX ORDER — MORPHINE SULFATE 10 MG/ML
2 INJECTION INTRAMUSCULAR; INTRAVENOUS; SUBCUTANEOUS EVERY 6 HOURS PRN
Status: DISCONTINUED | OUTPATIENT
Start: 2017-11-03 | End: 2017-11-04 | Stop reason: HOSPADM

## 2017-11-03 RX ORDER — ASPIRIN 81 MG/1
81 TABLET ORAL ONCE
Status: COMPLETED | OUTPATIENT
Start: 2017-11-03 | End: 2017-11-03

## 2017-11-03 RX ORDER — CLOPIDOGREL BISULFATE 75 MG/1
75 TABLET ORAL ONCE
Status: COMPLETED | OUTPATIENT
Start: 2017-11-03 | End: 2017-11-03

## 2017-11-03 RX ORDER — ACETAMINOPHEN 325 MG/1
650 TABLET ORAL EVERY 4 HOURS PRN
Status: DISCONTINUED | OUTPATIENT
Start: 2017-11-03 | End: 2017-11-03

## 2017-11-03 RX ORDER — CLOPIDOGREL BISULFATE 75 MG/1
75 TABLET ORAL DAILY
Status: DISCONTINUED | OUTPATIENT
Start: 2017-11-04 | End: 2017-11-03

## 2017-11-03 RX ORDER — TAMSULOSIN HYDROCHLORIDE 0.4 MG/1
0.4 CAPSULE ORAL ONCE
Status: COMPLETED | OUTPATIENT
Start: 2017-11-03 | End: 2017-11-03

## 2017-11-03 RX ORDER — SODIUM CHLORIDE 9 MG/ML
5 INJECTION, SOLUTION INTRAVENOUS CONTINUOUS
Status: DISCONTINUED | OUTPATIENT
Start: 2017-11-03 | End: 2017-11-03

## 2017-11-03 RX ORDER — CARVEDILOL 12.5 MG/1
12.5 TABLET ORAL DAILY
Status: DISCONTINUED | OUTPATIENT
Start: 2017-11-04 | End: 2017-11-04 | Stop reason: HOSPADM

## 2017-11-03 RX ORDER — ONDANSETRON 2 MG/ML
INJECTION INTRAMUSCULAR; INTRAVENOUS
Status: COMPLETED
Start: 2017-11-03 | End: 2017-11-03

## 2017-11-03 RX ORDER — LISINOPRIL 10 MG/1
10 TABLET ORAL DAILY
Status: DISCONTINUED | OUTPATIENT
Start: 2017-11-04 | End: 2017-11-04 | Stop reason: HOSPADM

## 2017-11-03 RX ORDER — HYDRALAZINE HYDROCHLORIDE 20 MG/ML
10 INJECTION INTRAMUSCULAR; INTRAVENOUS ONCE
Status: COMPLETED | OUTPATIENT
Start: 2017-11-03 | End: 2017-11-03

## 2017-11-03 RX ORDER — ATORVASTATIN CALCIUM 40 MG/1
80 TABLET, FILM COATED ORAL DAILY
Status: DISCONTINUED | OUTPATIENT
Start: 2017-11-04 | End: 2017-11-04 | Stop reason: HOSPADM

## 2017-11-03 RX ORDER — ASPIRIN 81 MG/1
81 TABLET ORAL DAILY
Status: DISCONTINUED | OUTPATIENT
Start: 2017-11-04 | End: 2017-11-03

## 2017-11-03 RX ORDER — HYDROCODONE BITARTRATE AND ACETAMINOPHEN 5; 325 MG/1; MG/1
1 TABLET ORAL EVERY 4 HOURS PRN
Status: DISCONTINUED | OUTPATIENT
Start: 2017-11-03 | End: 2017-11-03

## 2017-11-03 RX ADMIN — ONDANSETRON 4 MG: 2 INJECTION INTRAMUSCULAR; INTRAVENOUS at 03:11

## 2017-11-03 RX ADMIN — CLOPIDOGREL BISULFATE 75 MG: 75 TABLET ORAL at 10:11

## 2017-11-03 RX ADMIN — ASPIRIN 81 MG: 81 TABLET ORAL at 10:11

## 2017-11-03 RX ADMIN — ONDANSETRON 4 MG: 2 INJECTION, SOLUTION INTRAMUSCULAR; INTRAVENOUS at 03:11

## 2017-11-03 RX ADMIN — HYDROCODONE BITARTRATE AND ACETAMINOPHEN 1 TABLET: 5; 325 TABLET ORAL at 03:11

## 2017-11-03 RX ADMIN — HYDRALAZINE HYDROCHLORIDE 10 MG: 20 INJECTION INTRAMUSCULAR; INTRAVENOUS at 10:11

## 2017-11-03 RX ADMIN — TAMSULOSIN HYDROCHLORIDE 0.4 MG: 0.4 CAPSULE ORAL at 10:11

## 2017-11-03 RX ADMIN — LISINOPRIL 10 MG: 10 TABLET ORAL at 10:11

## 2017-11-03 RX ADMIN — ONDANSETRON 4 MG: 2 INJECTION INTRAMUSCULAR; INTRAVENOUS at 05:11

## 2017-11-03 RX ADMIN — MORPHINE SULFATE 2 MG: 10 INJECTION INTRAVENOUS at 08:11

## 2017-11-03 RX ADMIN — SODIUM CHLORIDE 5 ML/KG/HR: 0.9 INJECTION, SOLUTION INTRAVENOUS at 01:11

## 2017-11-03 NOTE — PLAN OF CARE
Pt called nurse to room; vomited bilious emesis x3. Pt denies any pain. Dr. Patricia paged and notified. PRN zofran ordered and will administer.

## 2017-11-03 NOTE — NURSING TRANSFER
Patient transferred to floor on tele monitor. VSS no c/o of pain.  Patient attached to tele monitor upon arrival in room.  Bilateral groin site reviewed with RN.  CDI, no hematoma no bleeding.  Bilat pedal pulses dopplered.  All questions answered.

## 2017-11-03 NOTE — NURSING
Patient arrived to recovery cath lab.  Pt drowsy but able to follow commands.  VSS.  Bilateral sheaths in place.  Left groin, venous, right groin, arterial.   CDI, site soft, no bleeding or hematoma noted. Pt's PIV d/c during cath lab procedure due to infiltration.  Warm compress applied to arm and elevated on pillow per MD. Pt has no c/o of pain at this time.  Will continue to monitor site.  Fall risk precautions given and patients acknowledges.

## 2017-11-03 NOTE — PROGRESS NOTES
[facility] for CHF and abnormal stress test with anterior wall defect 11/2017     LM patent   LAD distal 70% with iFR 0.82   LCX patent stent with distal luminal irregularities   RCA       iFR guided PCI of LAD with 2.75 x 38 and 3.0 x 12 mm Xience CAMILA   EF 45% with LVEDP 4 mmHg                                 Full note to follow

## 2017-11-03 NOTE — PLAN OF CARE
Problem: Patient Care Overview  Goal: Plan of Care Review  Outcome: Ongoing (interventions implemented as appropriate)  Reviewed plan of care with patient. Patient verbalized understanding. AAOx3. Transferred from stretcher to bed with 3 person assist. Pt verbalized understanding to remain flat. Recovery end time 1845. Fluids discontinued per orders. Pt complains of back pain from lying flat in procedure; pain med given before transfer to room. Left and right groin site CDI. Dressing in tact; no hematoma noted. Pulses to bilateral feet doppler. Right arm swollen to infiltrated IV during procedure; warm compress applied. Patient on continuous tele monitoring; SR-ST; HR 80s-low 100s. Bed alarm set, bed in lowest position, call bell in reach.  Will continue to monitor.

## 2017-11-03 NOTE — NURSING
Patient transferred to floor on tele monitor. VSS no c/o of pain.  Patient attached to tele monitor upon arrival in room.  Bilateral groin sites   reviewed with RN.  CDI, no hematoma no bleeding.  Bilat pedal pulses dopplered. Right arm assessed with RN.  Warm compress in place.  Pt has no c/o of pain with arm.   All questions answered.

## 2017-11-03 NOTE — PLAN OF CARE
Pt lying in bed with no c/o pain or distress noted.  Monitors applied, VSS.  Yellow socks placed on patient and fall risk reviewed with patient.  Pt verbalizes understanding.  Procedure and recovery process explained to pt and all questions answered.  Will continue to monitor.

## 2017-11-04 VITALS
HEART RATE: 75 BPM | SYSTOLIC BLOOD PRESSURE: 140 MMHG | BODY MASS INDEX: 22.62 KG/M2 | HEIGHT: 72 IN | RESPIRATION RATE: 16 BRPM | WEIGHT: 167 LBS | TEMPERATURE: 98 F | DIASTOLIC BLOOD PRESSURE: 80 MMHG | OXYGEN SATURATION: 98 %

## 2017-11-04 LAB
BASOPHILS # BLD AUTO: 0.01 K/UL
BASOPHILS NFR BLD: 0.2 %
DIFFERENTIAL METHOD: ABNORMAL
EOSINOPHIL # BLD AUTO: 0 K/UL
EOSINOPHIL NFR BLD: 0.5 %
ERYTHROCYTE [DISTWIDTH] IN BLOOD BY AUTOMATED COUNT: 13.6 %
HCT VFR BLD AUTO: 36 %
HGB BLD-MCNC: 11.2 G/DL
LYMPHOCYTES # BLD AUTO: 1.2 K/UL
LYMPHOCYTES NFR BLD: 20.8 %
MCH RBC QN AUTO: 26.6 PG
MCHC RBC AUTO-ENTMCNC: 31.1 G/DL
MCV RBC AUTO: 86 FL
MONOCYTES # BLD AUTO: 0.5 K/UL
MONOCYTES NFR BLD: 8.4 %
NEUTROPHILS # BLD AUTO: 3.9 K/UL
NEUTROPHILS NFR BLD: 69.9 %
PLATELET # BLD AUTO: 193 K/UL
PMV BLD AUTO: 12.6 FL
RBC # BLD AUTO: 4.21 M/UL
WBC # BLD AUTO: 5.58 K/UL

## 2017-11-04 PROCEDURE — 85025 COMPLETE CBC W/AUTO DIFF WBC: CPT

## 2017-11-04 PROCEDURE — 99224 PR SUBSEQUENT OBSERVATION CARE,LEVEL I: CPT | Mod: ,,, | Performed by: INTERNAL MEDICINE

## 2017-11-04 PROCEDURE — 36415 COLL VENOUS BLD VENIPUNCTURE: CPT

## 2017-11-04 PROCEDURE — 25000003 PHARM REV CODE 250: Performed by: INTERNAL MEDICINE

## 2017-11-04 RX ADMIN — TAMSULOSIN HYDROCHLORIDE 0.4 MG: 0.4 CAPSULE ORAL at 08:11

## 2017-11-04 RX ADMIN — CARVEDILOL 12.5 MG: 12.5 TABLET, FILM COATED ORAL at 08:11

## 2017-11-04 RX ADMIN — LISINOPRIL 10 MG: 10 TABLET ORAL at 08:11

## 2017-11-04 RX ADMIN — ATORVASTATIN CALCIUM 80 MG: 40 TABLET, FILM COATED ORAL at 08:11

## 2017-11-04 NOTE — PLAN OF CARE
Discharged home with instructions after seen by Dr Mujica Intravenous , IV. Access, telemetry removed prior to departure.

## 2017-11-04 NOTE — PROGRESS NOTES
Seen this am       Doing well      Kept overnight because of nausea and vomiting post procedure      Feels great this am      Vitals:    11/03/17 2050 11/04/17 0028 11/04/17 0440 11/04/17 0726   BP: (!) 150/80 133/76 (!) 156/78 (!) 172/87   Pulse:  75 71 75   Resp:  14 12 16   Temp:  97.9 °F (36.6 °C) 98.1 °F (36.7 °C) 98.1 °F (36.7 °C)   TempSrc:  Oral Oral Oral   SpO2:       Weight:       Height:                 LABS  CBC    Recent Labs  Lab 11/03/17  1030 11/04/17  0424   WBC 5.08 5.58   RBC 4.52* 4.21*   HGB 12.4* 11.2*   HCT 39.2* 36.0*    193   MCV 87 86   MCH 27.4 26.6*   MCHC 31.6* 31.1*     BMP    Recent Labs  Lab 11/03/17  1030 11/03/17  1725    138   K 4.1 3.8   CO2 21* 18*    109   BUN 14 12   CREATININE 1.0 0.8   GLU 83 98       POCT-Glucose  No results found for: POCTGLUCOSE      Recent Labs  Lab 11/03/17  1030 11/03/17  1725   CALCIUM 9.3 9.0       Lipid panel  Lab Results   Component Value Date    CHOL 200 (H) 08/16/2017    CHOL 155 11/03/2016     Lab Results   Component Value Date    HDL 39 (L) 08/16/2017    HDL 35 (L) 11/03/2016     Lab Results   Component Value Date    LDLCALC 138.8 08/16/2017    LDLCALC 108.0 11/03/2016     Lab Results   Component Value Date    TRIG 111 08/16/2017    TRIG 60 11/03/2016     Lab Results   Component Value Date    CHOLHDL 19.5 (L) 08/16/2017    CHOLHDL 22.6 11/03/2016               [facility] for CHF and abnormal stress test with anterior wall defect 11/2017                 LM patent              LAD distal 70% with iFR 0.82              LCX patent stent with distal luminal irregularities              RCA                             iFR guided PCI of LAD with 2.75 x 38 and 3.0 x 12 mm Xience CAMILA              EF 45% with LVEDP 4 mmHg             Will adjust his medications  Echo and stress test in 3 months  Follow up in 2 weeks for BP check and post procedure care

## 2017-11-04 NOTE — PLAN OF CARE
Problem: Patient Care Overview  Goal: Plan of Care Review  Bilat groin site wnl . Medicated with morphine for c/o back pain x 1 this shift. No more c/o nausea.. Bilat Pedal pulses weak. No true red alarm noted on telemetry. NSR noted.

## 2017-11-04 NOTE — PLAN OF CARE
Discharge orders noted, no HH or HME ordered.    Pt's nurse will go over medications/signs and symptoms prior to discharge       11/04/17 0840   Final Note   Assessment Type Final Discharge Note   Discharge Disposition Home   What phone number can be called within the next 1-3 days to see how you are doing after discharge? [phone]   Hospital Follow Up  Appt(s) scheduled? No  (offices closed, [place] to follow up)   Right Care Referral Info   Post Acute Recommendation No Care     Ebonie Martinez RN Transitional Navigator  [phone]

## 2017-11-04 NOTE — DISCHARGE INSTRUCTIONS
Coronary Artery Disease (CAD), Understanding (English) View Edit Remove   Hypertension, Established (English) View Edit Remove   CVA, Completed (English) View Edit Remove   Catheterization, Cardiac, Having (English) View Edit Remove   Heart Failure: Being Active (English) View Edit Remove   Heart Failure: Making Changes to Your Diet (English) View Edit Remove   Heart Failure: Tracking Your Weight (English) View Edit Remove   Stents, Coronary (English) View Edit Remove

## 2017-11-07 NOTE — DISCHARGE SUMMARY
[facility]  Cardiology  Discharge Summary      Patient Name: Pee Bocanegra  MRN: [id number]  Admission Date: 11/3/2017  Hospital Length of Stay: 0 days  Discharge Date and Time: 11/4/2017  Attending Physician: No att. providers found  Discharging Provider: Lloyd Mujica MD  Primary Care Physician: Yenifer Lockhart NP    HPI:      Details     Narrative        Date of Procedure:  11/03/2017    A. Indication/Pre-Operative Diagnosis     The patient is a 62 year old male that was referred for catheterization by Cassie Cardoso for abnormal CV function study (High risk findings), angina (CCS II) and cardiomypathy or decreased LV function.     B. Summary/Post-Operative Diagnosis       LM patent.    70% distal LAD with iFR 0.82.    LCX patent stent with distal luminal irregularities.    RCA  with left to right collaterals.    iFR guided PCI of LAD with 2.75 x 38 and 3.0 x 12 mm Xience CAMILA.    EF 45% with LVEDP 4 mmHg.    C. HPI     I have reviewed the history and physical completed on 11/03/2017. The patient has been examined and the following changes have been noted:        Patient ID:  Pee Bocanegra is a 62 y.o. male who presents for follow up of Coronary Artery Disease; Hyperlipidemia; Hypertension; Abnormal Stress Test; abnormal EF; and asymptomatic L subclavian artery disease        HPI:         He is today for follow up of an abnormal nuclear stress test ordered for intermittent dyspnea with exertion which improved but did not completely resolve adding lasix 20. He is compliant with his his medications. Stress test revealed anterior wall defect   with an EF 39%.         His last admission for CHF decompensation was 11/2019/2016 for dietary indiscretion. He is on aspirin and plavix for DAPT. He has L subclavian stenosis with 20-30 mmHg gradient without any symptoms. He has bilateral SFA and AT CTOs without claudication.   He continuous to be active as a  despite his R hemispheric  CVA. He walks with a cane. No ulcers.             Cath report 11/2016          LM patent, LAD patent, D1 diffusely dz, prox & mid LCX 95% , mid 95% , and RCA .    SVG-OM with mid 95%, SVG-D1 occluded, and SVG-PDA occluded.    EF 45% with inferior wall hypokinesis and LVEDP 40 mmHg.    PCI of proximal LCX with 3.5 x 18 mm Resolute and mid LCX with 2.75 x 30 mm Resolute CAMILA.    No intervention of SVG-OM as it supplies a small territory.    The native LCX provides flow to the territory distal to SVG-OM anastomosis.       Patient history was obtained from the patient.     Counseling: The patient was counseled regarding the potential risks and benefits of this procedure, as well as possible alternative approaches to the problem and gave informed consent.    The ASA Score was Class II.     The patient had a previous angiogram at an outside facility. The films were available for review.     Height: 72 in.          OUTPATIENT MEDICATIONS: Medications were reviewed.  ALLERGIES: Allergies were reviewed.    Laboratory data revealed:     11/03/2017 CREAT  1.0, GLU  83, HCT  39.2, HGB  12.4, INR  1.0, K  4.1, NA  139, PLT  166, PTI  10.7, WBCIR  5.08, BUN  14            Manual Labs:  ACT Reference Range:  sec, ACT-PLUS cartridge Cardiopulmonary Bypass: 410-440 sec, ACT-LR cartridge Indwelling Vascular sheath Removal:  sec  11/03/2017 11:56  , 12:00        D. Hemodynamic Results     LVEDP (Pre): 4 mmHg  LVEDP (Post): 4 mmHg  Ejection Fraction: 45%    AIR REST:  AO: 188/72 (118)  LV: 188  LVEDP: 4       E. Angiographic Results     Diagnostic:          Patient has a right dominant coronary artery.        - Left Main Coronary Artery:             The LM has luminal irregularities. There is AMY 3 flow.     - Left Anterior Descending Artery:             The mid LAD has luminal irregularities. There is AMY 3 flow.             The distal LAD has a 70% stenosis. There is AMY 3 flow.     - D1:              The D1 has luminal irregularities. There is AMY 3 flow.     - D2:             The D2 has luminal irregularities. There is AMY 3 flow.     - Left Circumflex Artery:             The mid LCX has luminal irregularities. There is AMY 3 flow. Patent stent             The distal LCX has luminal irregularities. There is AMY 3 flow. Patent stent      - OM1:             The OM1 has luminal irregularities. There is AMY 3 flow.     - OM2:             The OM2 has luminal irregularities. There is AMY 3 flow.     - Right Coronary Artery:             The mid RCA has chronic total occlusion. There is AMY 0 flow. There are left to right collaterals      - Abdominal Aorta:             The infra renal abdominal aorta has luminal irregularities.      Intervention          Distal LAD:              The lesion was successfully intervened. Post-stenosis of 0% and post-AMY 3 flow. The vessel was accessed natively.  The following items were used: Blln Trek Rx 2.5 X 20, Stent Xience Alpine Rx 2.75 X 38 (CAMILA) and Stent Xience Alpine Rx 3.0 X   12 (CAMILA).    F. Details of Procedure     PROCEDURES PERFORMED: Abdominal Aortic Angio, Drug Eluting Stent (1 vessel) - Coronary, PTCA (1 vessel), Coronary Angio and Instantaneous Wave-Free Ratio (iFR)    ANESTHESIA: Conscious sedation was achieved with 75 mcg of FENTANYL 100MCG/2ML and 3 mg of Versed. Local anesthesia was achieved with 20 ml of Lidocaine 1%. Moderate conscious sedation was performed and cardiorespiratory functions were monitored the   entire procedure by Ruthy Bonilla RN. Sedation began at 11:21 AM and concluded at 12:35 PM, totalling 74.9 minutes.     PRIMARY SURGEON: Lloyd Mujica MD    COMPLICATIONS: There were no complications.    Medications given on sterile field: Lidocaine 1% (20 ml).    Medications given during procedure: Heparin 1000u/500cc Bag (3 bags), Versed (3 mg), Fentanyl 100mcg/2ml (75 mcg), Heparin 1000u/ml Inj (89906 units), Nitroglycerine 200mcg/ml (200 mcg),  Phenergan 25 Mg/ml (25 mg), Zofran (onedanstrin) 4mg/2ml (4 mg) and   Plavix 75 Mg (300 mg).    The patient was brought to the catheterization laboratory after premedication with oral Benadryl 50 mg. The Kit, Manifold was flushed and connected to contrast. Bilateral groin prepped and draped. After local anesthesia, a Sheath 4fr X 7cm Micropuncture   was inserted into the right femoral artery. Angiography performed to evaluate for closure device in the right femoral artery. A Wire J .035 X 150 was inserted into the right femoral artery. The Sheath 4fr X 7cm Micropuncture was removed. A Sheath 6fr X   11cm was inserted into the right femoral artery.     LM    A Cath 6fr Fl 4.0 was inserted into the ostial LM. The Wire J .035 X 150 was removed. Angiography performed in multiple views in the left coronary arteries. The Cath 6fr Fl 4.0 was removed.     RCA    A Cath 6fr Fr 4.0 was inserted into the ostial RCA. Attempted to insert Cath 6fr Fr 4.0 into the ostial RCA. A Cath 6fr Wr was inserted into the ostial RCA. Angiography performed in multiple views in the right coronary arteries. The Cath 6fr Wr was   removed.     Left  CORONARY ARTERIES    A Guide Launcher 6fr Jl 4.0 was inserted into the left coronary arteries.     LAD    A [place] Pressure Wire was inserted into the distal LAD. Distal LAD IFR 0.84. A Blln Trek Rx 2.5 X 20 was inserted into the distal LAD and was inflated with indeflator at 10.0 julián. for 15.0 secs. The Blln Trek Rx 2.5 X 20 was removed. A Stent   Xience Alpine Rx 2.75 X 38 (CAMILA) was inserted into the distal LAD. Stent balloon inflated with indeflator and stent deployed at 16.0 julián. for 45.0 secs. in the distal LAD. A Stent Xience Alpine Rx 3.0 X 12 (CAMILA) was inserted into the distal LAD. Stent   balloon inflated with indeflator and stent deployed at 12.0 julián. for 30.0 secs. in the distal LAD and stent balloon inflated with indeflator at 12.0 julián. for 15.0 secs. The [place] Verrata Pressure  Wire was removed. The Guide Launcher 6fr Jl 4.0 was   removed.     Left  VENTRICLE    A Cath 6fr Pigtail 145 was inserted into the left ventricle. Hemodynamics recorded in the left ventricle. 200.0 mcg of Nitroglycerine 200mcg/ml was injected intraarterial per MD. The Cath 6fr Pigtail 145 was removed. A Sheath 4fr X 7cm Micropuncture was   inserted into the left Femoral vein. The Sheath 4fr X 7cm Micropuncture was removed. A Sheath 4fr X 11cm was inserted into the left Femoral vein.     ABDOMINAL AORTA    The Cath 6fr Pigtail 145 was reinserted into the infra renal abdominal aorta. Angiography performed in the infra renal abdominal aorta. The Cath 6fr Pigtail 145 was removed.     The Sheath 6fr X 11cm was removed and manual pressure was applied. The Sheath 4fr X 11cm was sutured in. Total Visipaque injected was 195.0 ml. Total Visipaque used was 300.0 ml.       Fluoroscopy Time 18.8 minutes   Radiation Dose 695.4 mGy   Contrast Injected 195 ml Visipaque   Contrast Used  300 ml Visipaque            Procedure log documented by Dejah Caballero RN and verified by Lloyd Mujica MD    ESTIMATED BLOOD LOSS is < 50 cc.    SPECIMEN: No specimen.     G. Recommendations     1. Routine post PCI care.  2. Risk factor reductions.  3. ASA 81mg.  4. Clopidogrel (Plavix) for one year.  5. Statin therapy.    I certify that I was present for catheter insertion, catheter manipulation, angiography, angiographic interpretation, and all interventional procedures performed on this patient.          Procedure(s) (LRB):  HEART CATH-LEFT (Left)     Indwelling Lines/Drains at time of discharge:  Lines/Drains/Airways          No matching active lines, drains, or airways          Hospital Course:      He was nauseated and spent the night  DC next day without any issues    Consults:     Significant Diagnostic Studies:     Labs, ecg, and angiogram    Pending Diagnostic Studies:     None          Final Active Diagnoses:    Diagnosis Date Noted POA     PRINCIPAL PROBLEM:  Coronary artery disease involving native coronary artery of native heart without angina pectoris [I25.10] 11/03/2016 Yes    Abnormal cardiovascular stress test [R94.39] 10/25/2017 Yes    Bilateral carotid bruits [R09.89] 07/12/2017 Yes    Subclavian artery stenosis, left [I77.1] 11/17/2016 Yes    PAD (peripheral artery disease) [I73.9] 11/04/2016 Yes    Chronic diastolic heart failure [I50.32] 11/04/2016 Yes    Tobacco abuse, 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    Specialties:  Cardiology, INTERVENTIONAL CARDIOLOGY  Contact information:  [address] [place] [id number] [phone]                 Patient Instructions:       DC home      Follow up with PCP      Follow up with Dr. Mujica or primary cardiologist as scheduled      Cardiac diet      Resume normal activities in 3 days    Medications:      Discharge Medication List as of 11/4/2017 10:43 AM      CONTINUE these medications which have NOT CHANGED    Details   aspirin (ECOTRIN) 81 MG EC tablet Take 1 tablet (81 mg total) by mouth once daily., Starting 11/4/2016, Until Sat 11/4/17, OTC      atorvastatin (LIPITOR) 80 MG tablet Take 1 tablet (80 mg total) by mouth once daily., Starting Wed 10/25/2017, Until Thu 10/25/2018, Normal      carvedilol (COREG) 12.5 MG tablet Take 1 tablet (12.5 mg total) by mouth once daily., Starting Wed 10/25/2017, Normal      clopidogrel (PLAVIX) 75  mg tablet Take 1 tablet (75 mg total) by mouth once daily., Starting Wed 10/25/2017, Until Thu 10/25/2018, Normal      furosemide (LASIX) 20 MG tablet Take 1 tablet (20 mg total) by mouth 2 (two) times daily., Starting Wed 11/30/2016, Until Thu 11/30/2017, No Print      lisinopril 10 MG tablet Take 1 tablet (10 mg total) by mouth once daily., Starting Wed 10/25/2017, Until Thu 10/25/2018, No Print      oxycodone-acetaminophen (PERCOCET) 5-325 mg per tablet Take 1 tablet by mouth every 6 (six) hours as needed for Pain., Starting Tue 7/25/2017, Print      tamsulosin (FLOMAX) 0.4 mg Cp24 Take 0.4 mg by mouth once daily., Until Discontinued, Historical Med      nitroGLYCERIN (NITROSTAT) 0.4 MG SL tablet Place 1 tablet (0.4 mg total) under the tongue every 5 (five) minutes as needed for Chest pain., Starting Wed 10/25/2017, Until Thu 10/25/2018, Normal         STOP taking these medications       acetaminophen-codeine 300-30mg (TYLENOL #3) 300-30 mg Tab Comments:   Reason for Stopping:                  Time spent on the discharge of patient: 45  minutes    Lloyd Mujica MD  Cardiology  [facility]

## 2017-11-07 NOTE — H&P (VIEW-ONLY)
Subjective:   Patient ID:  Pee Bocanegra is a 62 y.o. male who presents for follow up of Coronary Artery Disease; Hyperlipidemia; Hypertension; Abnormal Stress Test; abnormal EF; and asymptomatic L subclavian artery disease      HPI:       He is today for follow up of an abnormal nuclear stress test ordered for intermittent dyspnea with exertion which improved but did not completely resolve adding lasix 20. He is compliant with his his medications. Stress test revealed anterior wall defect with an EF 39%.       His last admission for CHF decompensation was 11/2019/2016 for dietary indiscretion. He is on aspirin and plavix for DAPT. He has L subclavian stenosis with 20-30 mmHg gradient without any symptoms. He has bilateral SFA and AT CTOs without claudication. 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 He walks with a cane. No ulcers.             Cath report 11/2016        LM patent, LAD patent, D1 diffusely dz, prox & mid LCX 95% , mid 95% , and RCA .    SVG-OM with mid 95%, SVG-D1 occluded, and SVG-PDA occluded.    EF 45% with inferior wall hypokinesis and LVEDP 40 mmHg.    PCI of proximal LCX with 3.5 x 18 mm Resolute and mid LCX with 2.75 x 30 mm Resolute CAMILA.    No intervention of SVG-OM as it supplies a small territory.    The native LCX provides flow to the territory distal to SVG-OM anastomosis.             Patient Active Problem List    Diagnosis Date Noted    Abnormal cardiovascular stress test 10/25/2017             Nuclear 8/2017   Anterior wall ischemia   EF 39%           Bilateral carotid bruits 07/12/2017    Subclavian artery stenosis, left 11/17/2016         30 mmHg gradient   asymptomatic      Chronic diastolic heart failure 11/04/2016     -TTE (11/3/2016) Low normal to mildly depressed LVEF 50-55%; diastolic dysfunction; moderate LAE; mild AI/MR      PAD (peripheral artery disease) 11/04/2016         Bilateral SFA       NSTEMI (non-ST elevated myocardial  infarction) 2016    Coronary artery disease involving native coronary artery of native heart without angina pectoris 2016     -NSTEMI  -LHC (11/3/2016) LVEDP 40 mmHg; LVEF 50% mild  Hypokinesis to inferior wall; LM LIs; LAD LIs; pLCX 95%; mLCX 95%; RCA ; SVG-OM1 95%; SVG-D1 occluded; SVG-RCA occluded   pLCX 3.5x18 Resolute (CAMILA) and mLCS 2.75 x30 Resolute (CAMILA)                              Essential hypertension 2016    Other and unspecified hyperlipidemia 2016    Tobacco abuse, in remission 2016         Quit 2016      Muscle weakness of lower extremity 2016    Hamstring tightness of both lower extremities 2016    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 2016    Abnormality of gait following cerebrovascular accident (CVA) 2016           Right Arm BP - Sittin/72  Left Arm BP - Sittin/50        LABS    Lipid panel  Lab Results   Component Value Date    CHOL 200 (H) 2017    CHOL 155 2016     Lab Results   Component Value Date    HDL 39 (L) 2017    HDL 35 (L) 2016     Lab Results   Component Value Date    LDLCALC 138.8 2017    LDLCALC 108.0 2016     Lab Results   Component Value Date    TRIG 111 2017    TRIG 60 2016     Lab Results   Component Value Date    CHOLHDL 19.5 (L) 2017    CHOLHDL 22.6 2016            Review of Systems   Constitution: Negative for diaphoresis, weakness, night sweats, weight gain and weight loss.   HENT: Negative for congestion.    Eyes: Negative for blurred vision, discharge and double vision.   Cardiovascular: Negative for chest pain, claudication, cyanosis, dyspnea on exertion, irregular heartbeat, leg swelling, near-syncope, orthopnea, palpitations, paroxysmal nocturnal dyspnea and syncope.   Respiratory: Negative for cough, shortness of breath and wheezing.    Endocrine: Negative for cold intolerance, heat intolerance and polyphagia.    Hematologic/Lymphatic: Negative for adenopathy and bleeding problem. Does not bruise/bleed easily.   Skin: Negative for dry skin and nail changes.   Musculoskeletal: Negative for arthritis, back pain, falls, joint pain, myalgias and neck pain.   Gastrointestinal: Negative for bloating, abdominal pain, change in bowel habit and constipation.   Genitourinary: Negative for bladder incontinence, dysuria, flank pain, genital sores and missed menses.   Neurological: Negative for aphonia, brief paralysis, difficulty with concentration and dizziness.   Psychiatric/Behavioral: Negative for altered mental status and memory loss. The patient does not have insomnia.    Allergic/Immunologic: Negative for environmental allergies.       Objective:   Physical Exam   Constitutional: He is oriented to person, place, and time. He appears well-developed and well-nourished. He is not intubated.   HENT:   Head: Normocephalic and atraumatic.   Right Ear: External ear normal.   Left Ear: External ear normal.   Mouth/Throat: Oropharynx is clear and moist.   Eyes: Conjunctivae and EOM are normal. Pupils are equal, round, and reactive to light. Right eye exhibits no discharge. Left eye exhibits no discharge. No scleral icterus.   Neck: Normal range of motion. Neck supple. Normal carotid pulses, no hepatojugular reflux and no JVD present. Carotid bruit is not present. No tracheal deviation present. No thyromegaly present.   Cardiovascular: Normal rate, regular rhythm, S1 normal and S2 normal.   No extrasystoles are present. PMI is not displaced.  Exam reveals no gallop, no S3, no distant heart sounds, no friction rub and no midsystolic click.    No murmur heard.  Pulses:       Carotid pulses are 2+ on the right side, and 2+ on the left side.       Radial pulses are 2+ on the right side, and 2+ on the left side.        Femoral pulses are 2+ on the right side, and 2+ on the left side.       Popliteal pulses are 2+ on the right side, and 2+ on  the left side.        Dorsalis pedis pulses are 0 on the right side, and 0 on the left side.        Posterior tibial pulses are 0 on the right side, and 0 on the left side.     Monophasic bilateral DP doppler signals  Triphasic L PT and Biphasic R PT doppler signals    Pulmonary/Chest: Effort normal and breath sounds normal. No accessory muscle usage or stridor. No apnea, no tachypnea and no bradypnea. He is not intubated. No respiratory distress. He has no decreased breath sounds. He has no wheezes. He has no rales. He exhibits no tenderness and no bony tenderness.   Abdominal: He exhibits no distension, no pulsatile liver, no abdominal bruit, no ascites, no pulsatile midline mass and no mass. There is no tenderness. There is no rebound and no guarding.   Musculoskeletal: Normal range of motion. He exhibits no edema or tenderness.   Lymphadenopathy:     He has no cervical adenopathy.   Neurological: He is alert and oriented to person, place, and time. He has normal reflexes. No cranial nerve deficit. Coordination normal.   Chronic L sided weakness   Skin: Skin is warm. No rash noted. No erythema. No pallor.   Psychiatric: He has a normal mood and affect. His behavior is normal. Judgment and thought content normal.       Assessment:     1. Coronary artery disease involving native coronary artery of native heart without angina pectoris    2. Essential hypertension    3. PAD (peripheral artery disease)    4. Subclavian artery stenosis, left    5. Abnormality of gait following cerebrovascular accident (CVA)    6. 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 Abnormal cardiovascular stress test        Plan:         Because of CHF and abnormal stress test   He will have a LHC   iFR of LAD    Possible PCI of SVG-OM       R CFA access with 6fr sheath    Risks, benefits and alternatives of cardiac catheterization and possible intervention were discussed with the patient. All questions were answered and  informed consent obtained.     I discussed the importance of compliance with dual antiplatelet therapy with the patient to prevent acute or late stent thrombosis with premature discontinuation of the therapy.      Patient is a candidate for drug eluting stents.     Verbally the patient has expressed full understanding of the clinical importance of dual antiplatelet therapy compliance. Drug eluting stents require a minimum of 12 months of dual anti-platelet therapy.          Continue with current medical plan and lifestyle changes.  Return sooner for concerns or questions. If symptoms persist go to the ED  I have reviewed all pertinent data on this patient       Lasix 20 mg po daily   Low salt diet  1.5 L fluid restriction        Follow up as scheduled. Return sooner for concerns or questions  Call patient with the final plan        He expressed verbal understanding and agreed with the plan        I have reviewed the patient's medical history in detail and updated the computerized patient record.    Orders Placed This Encounter   Procedures    Case Request-Cath Lab: HEART CATH-LEFT     Standing Status:   Standing     Number of Occurrences:   1     Order Specific Question:   CPT Code:     Answer:   ME CATH PLACE/CORON ANGIO, IMG SUPER/INTERP,W LEFT HEART VENTRICULOGRAPHY [25225]       Follow up as scheduled. Return sooner for concerns or questions      Greater than 50% of the visit of 45 minutes was spent counseling, educating, and coordinating the care of the patient.  -In today's visit, at least 4 established conditions that pose a risk to life or bodily function have been addressed and the conditions are severe.    -In today's visit, monitoring for drug toxicity was accomplished.                Patient's Medications   New Prescriptions    No medications on file   Previous Medications    ACETAMINOPHEN-CODEINE 300-30MG (TYLENOL #3) 300-30 MG TAB    Take by mouth.    ASPIRIN (ECOTRIN) 81 MG EC TABLET    Take 1 tablet  (81 mg total) by mouth once daily.    FUROSEMIDE (LASIX) 20 MG TABLET    Take 1 tablet (20 mg total) by mouth 2 (two) times daily.    OXYCODONE-ACETAMINOPHEN (PERCOCET) 5-325 MG PER TABLET    Take 1 tablet by mouth every 6 (six) hours as needed for Pain.    TAMSULOSIN (FLOMAX) 0.4 MG CP24    Take 0.4 mg by mouth once daily.   Modified Medications    Modified Medication Previous Medication    ATORVASTATIN (LIPITOR) 80 MG TABLET atorvastatin (LIPITOR) 80 MG tablet       Take 1 tablet (80 mg total) by mouth once daily.    Take 1 tablet (80 mg total) by mouth once daily.    CARVEDILOL (COREG) 12.5 MG TABLET carvedilol (COREG) 12.5 MG tablet       Take 1 tablet (12.5 mg total) by mouth once daily.    Take 12.5 mg by mouth once daily.    CLOPIDOGREL (PLAVIX) 75 MG TABLET clopidogrel (PLAVIX) 75 mg tablet       Take 1 tablet (75 mg total) by mouth once daily.    Take 1 tablet (75 mg total) by mouth once daily.    LISINOPRIL 10 MG TABLET lisinopril 10 MG tablet       Take 1 tablet (10 mg total) by mouth once daily.    Take 1 tablet (10 mg total) by mouth once daily.    NITROGLYCERIN (NITROSTAT) 0.4 MG SL TABLET nitroGLYCERIN (NITROSTAT) 0.4 MG SL tablet       Place 1 tablet (0.4 mg total) under the tongue every 5 (five) minutes as needed for Chest pain.    Place 1 tablet (0.4 mg total) under the tongue every 5 (five) minutes as needed for Chest pain.   Discontinued Medications    No medications on file

## 2017-11-07 NOTE — INTERVAL H&P NOTE
The patient has been examined and the H&P has been reviewed:        Anesthesia/Surgery risks, benefits and alternative options discussed and understood by patient/family.          Active Hospital Problems    Diagnosis  POA    *Coronary artery disease involving native coronary artery of native heart without angina pectoris [I25.10]  Yes     -NSTEMI  -[facility] (11/3/2016) LVEDP 40 mmHg; LVEF 50% mild  Hypokinesis to inferior wall; LM LIs; LAD LIs; pLCX 95%; mLCX 95%; RCA ; SVG-OM1 95%; SVG-D1 occluded; SVG-RCA occluded   pLCX 3.5x18 Resolute (CAMILA) and mLCS 2.75 x30 Resolute (CAMILA)          [facility] for CHF and abnormal stress test with anterior wall defect 11/2017     LM patent   LAD distal 70% with iFR 0.82   LCX patent stent with distal luminal irregularities   RCA       iFR guided PCI of LAD with 2.75 x 38 and 3.0 x 12 mm Xience CAMILA   EF 45% with LVEDP 4 mmHg                                   Abnormal cardiovascular stress test [R94.39]  Yes             Nuclear 8/2017   Anterior wall ischemia   EF 39%           Bilateral carotid bruits [R09.89]  Yes    Subclavian artery stenosis, left [I77.1]  Yes         30 mmHg gradient   asymptomatic      PAD (peripheral artery disease) [I73.9]  Yes         Bilateral SFA       Chronic diastolic heart failure [I50.32]  Yes     -TTE (11/3/2016) Low normal to mildly depressed LVEF 50-55%; diastolic dysfunction; moderate LAE; mild AI/MR      Tobacco abuse, in remission [F17.201]  Not Applicable         Quit 11/2016      NSTEMI (non-ST elevated myocardial infarction) [I21.4]  Yes    Essential hypertension [I10]  Yes    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

## 2017-11-14 LAB
POC ACTIVATED CLOTTING TIME K: 125 SEC (ref 74–137)
POC ACTIVATED CLOTTING TIME K: 164 SEC (ref 74–137)
SAMPLE: ABNORMAL
SAMPLE: NORMAL

## 2018-01-24 ENCOUNTER — OFFICE VISIT (OUTPATIENT)
Dept: CARDIOLOGY | Facility: CLINIC | Age: 63
End: 2018-01-24
Payer: MEDICAID

## 2018-01-24 VITALS
HEIGHT: 72 IN | BODY MASS INDEX: 23.16 KG/M2 | WEIGHT: 171 LBS | SYSTOLIC BLOOD PRESSURE: 128 MMHG | HEART RATE: 65 BPM | DIASTOLIC BLOOD PRESSURE: 78 MMHG

## 2018-01-24 DIAGNOSIS — I10 ESSENTIAL HYPERTENSION: ICD-10-CM

## 2018-01-24 DIAGNOSIS — I69.398 IMPAIRED BALANCE AS LATE EFFECT OF CEREBROVASCULAR ACCIDENT: ICD-10-CM

## 2018-01-24 DIAGNOSIS — I73.9 PAD (PERIPHERAL ARTERY DISEASE): ICD-10-CM

## 2018-01-24 DIAGNOSIS — R26.89 IMPAIRED BALANCE AS LATE EFFECT OF CEREBROVASCULAR ACCIDENT: ICD-10-CM

## 2018-01-24 DIAGNOSIS — R06.00 DYSPNEA, UNSPECIFIED TYPE: ICD-10-CM

## 2018-01-24 DIAGNOSIS — F17.201 TOBACCO ABUSE, IN REMISSION: ICD-10-CM

## 2018-01-24 DIAGNOSIS — I69.398 ABNORMALITY OF GAIT FOLLOWING CEREBROVASCULAR ACCIDENT (CVA): ICD-10-CM

## 2018-01-24 DIAGNOSIS — I77.1 SUBCLAVIAN ARTERY STENOSIS, LEFT: ICD-10-CM

## 2018-01-24 DIAGNOSIS — I25.10 CORONARY ARTERY DISEASE INVOLVING NATIVE CORONARY ARTERY OF NATIVE HEART WITHOUT ANGINA PECTORIS: Primary | ICD-10-CM

## 2018-01-24 DIAGNOSIS — R26.9 ABNORMALITY OF GAIT FOLLOWING CEREBROVASCULAR ACCIDENT (CVA): ICD-10-CM

## 2018-01-24 PROCEDURE — 99213 OFFICE O/P EST LOW 20 MIN: CPT | Mod: PBBFAC,PO | Performed by: INTERNAL MEDICINE

## 2018-01-24 PROCEDURE — 99999 PR PBB SHADOW E&M-EST. PATIENT-LVL III: CPT | Mod: PBBFAC,,, | Performed by: INTERNAL MEDICINE

## 2018-01-24 PROCEDURE — 99214 OFFICE O/P EST MOD 30 MIN: CPT | Mod: S$PBB,,, | Performed by: INTERNAL MEDICINE

## 2018-01-24 RX ORDER — LISINOPRIL 10 MG/1
10 TABLET ORAL DAILY
Qty: 90 TABLET | Refills: 3 | Status: SHIPPED | OUTPATIENT
Start: 2018-01-24 | End: 2018-02-20 | Stop reason: DRUGHIGH

## 2018-01-24 RX ORDER — CLOPIDOGREL BISULFATE 75 MG/1
75 TABLET ORAL DAILY
Qty: 90 TABLET | Refills: 3 | Status: SHIPPED | OUTPATIENT
Start: 2018-01-24 | End: 2019-10-16

## 2018-01-24 RX ORDER — FUROSEMIDE 20 MG/1
20 TABLET ORAL DAILY
Qty: 90 TABLET | Refills: 3 | Status: SHIPPED | OUTPATIENT
Start: 2018-01-24 | End: 2019-10-16

## 2018-01-24 RX ORDER — NITROGLYCERIN 0.4 MG/1
0.4 TABLET SUBLINGUAL EVERY 5 MIN PRN
Qty: 20 TABLET | Refills: 11 | Status: SHIPPED | OUTPATIENT
Start: 2018-01-24 | End: 2019-10-16

## 2018-01-24 RX ORDER — ASPIRIN 81 MG/1
81 TABLET ORAL DAILY
Refills: 0 | COMMUNITY
Start: 2018-01-24 | End: 2019-10-16

## 2018-01-24 RX ORDER — CARVEDILOL 12.5 MG/1
12.5 TABLET ORAL DAILY
Qty: 180 TABLET | Refills: 3 | Status: SHIPPED | OUTPATIENT
Start: 2018-01-24

## 2018-01-24 RX ORDER — ATORVASTATIN CALCIUM 80 MG/1
80 TABLET, FILM COATED ORAL DAILY
Qty: 90 TABLET | Refills: 3 | Status: SHIPPED | OUTPATIENT
Start: 2018-01-24 | End: 2019-10-16

## 2018-01-24 NOTE — PROGRESS NOTES
Subjective:   Patient ID:  Pee Bocanegra is a 62 y.o. male who presents for follow up of Coronary Artery Disease; Hyperlipidemia; Hypertension; and Shortness of Breath      HPI:         He is here today for follow up of recent coronary intervention. His stress test revealed anterior wall defect with an EF 39%. LHC 11/2017 revealed a patent LM, 70% distal LAD with iFR 0.82, LCX patent stent with distal luminal irregularities and RCA  with left to right collaterals. iFR guided PCI of LAD with 2.75 x 38 and 3.0 x 12 mm Xience CAMILA. EF 45% with LVEDP 4 mmHg. PET stress test in the past revealed no ischemia in the RCA area suggestive of adequate collaterals. He still complains of dyspnea with exertion despite a LVEDP 4 mmHg. He was a smoker for 50 years and quit in 2016 after our first interaction for an admission related to NSTEMI.       He is on aspirin and plavix for DAPT. He has L subclavian stenosis with 20-30 mmHg gradient without any symptoms. He has bilateral SFA and AT CTOs without claudication. 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 He walks with a cane. No ulcers.                        Patient Active Problem List    Diagnosis Date Noted    Abnormal cardiovascular stress test 10/25/2017             Nuclear 8/2017   Anterior wall ischemia   EF 39%           Bilateral carotid bruits 07/12/2017    Subclavian artery stenosis, left 11/17/2016         30 mmHg gradient   asymptomatic      Chronic diastolic heart failure 11/04/2016     -TTE (11/3/2016) Low normal to mildly depressed LVEF 50-55%; diastolic dysfunction; moderate LAE; mild AI/MR      PAD (peripheral artery disease) 11/04/2016         Bilateral SFA       NSTEMI (non-ST elevated myocardial infarction) 11/03/2016    Coronary artery disease involving native coronary artery of native heart without angina pectoris 11/03/2016     -NSTEMI  -[facility] (11/3/2016) LVEDP 40 mmHg; LVEF 50% mild  Hypokinesis to inferior  wall; LM LIs; LAD LIs; pLCX 95%; mLCX 95%; RCA ; SVG-OM1 95%; SVG-D1 occluded; SVG-RCA occluded   pLCX 3.5x18 Resolute (CAMILA) and mLCS 2.75 x30 Resolute (CAMILA)          [facility]-2017   CHF+ abnormal stress test-anterior wall defect    LM patent   LAD distal 70% with iFR 0.82   LCX patent stent with distal luminal irregularities   RCA       PCI of LAD with 2.75 x 38 +3.0 x 12 mm Xience CAMILA   EF 45% with LVEDP 4 mmHg                                         Essential hypertension 2016    Other and unspecified hyperlipidemia 2016    Tobacco abuse, in remission 2016         Quit 2016  Smoked for 50 yrs       Muscle weakness of lower extremity 2016    Hamstring tightness of both lower extremities 2016    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 2016    Abnormality of gait following cerebrovascular accident (CVA) 2016           Right Arm BP - Sittin/78  Left Arm BP - Sittin/50        LABS    Lipid panel  Lab Results   Component Value Date    CHOL 200 (H) 2017    CHOL 155 2016     Lab Results   Component Value Date    HDL 39 (L) 2017    HDL 35 (L) 2016     Lab Results   Component Value Date    LDLCALC 138.8 2017    LDLCALC 108.0 2016     Lab Results   Component Value Date    TRIG 111 2017    TRIG 60 2016     Lab Results   Component Value Date    CHOLHDL 19.5 (L) 2017    CHOLHDL 22.6 2016            Review of Systems   Constitution: Negative for diaphoresis, weakness, night sweats, weight gain and weight loss.   HENT: Negative for congestion.    Eyes: Negative for blurred vision, discharge and double vision.   Cardiovascular: Negative for chest pain, claudication, cyanosis, dyspnea on exertion, irregular heartbeat, leg swelling, near-syncope, orthopnea, palpitations, paroxysmal nocturnal dyspnea and syncope.   Respiratory: Negative for cough, shortness of breath and wheezing.     Endocrine: Negative for cold intolerance, heat intolerance and polyphagia.   Hematologic/Lymphatic: Negative for adenopathy and bleeding problem. Does not bruise/bleed easily.   Skin: Negative for dry skin and nail changes.   Musculoskeletal: Negative for arthritis, back pain, falls, joint pain, myalgias and neck pain.   Gastrointestinal: Negative for bloating, abdominal pain, change in bowel habit and constipation.   Genitourinary: Negative for bladder incontinence, dysuria, flank pain, genital sores and missed menses.   Neurological: Negative for aphonia, brief paralysis, difficulty with concentration and dizziness.   Psychiatric/Behavioral: Negative for altered mental status and memory loss. The patient does not have insomnia.    Allergic/Immunologic: Negative for environmental allergies.       Objective:   Physical Exam   Constitutional: He is oriented to person, place, and time. He appears well-developed and well-nourished. He is not intubated.   HENT:   Head: Normocephalic and atraumatic.   Right Ear: External ear normal.   Left Ear: External ear normal.   Mouth/Throat: Oropharynx is clear and moist.   Eyes: Conjunctivae and EOM are normal. Pupils are equal, round, and reactive to light. Right eye exhibits no discharge. Left eye exhibits no discharge. No scleral icterus.   Neck: Normal range of motion. Neck supple. Normal carotid pulses, no hepatojugular reflux and no JVD present. Carotid bruit is not present. No tracheal deviation present. No thyromegaly present.   Cardiovascular: Normal rate, regular rhythm, S1 normal and S2 normal.   No extrasystoles are present. PMI is not displaced.  Exam reveals no gallop, no S3, no distant heart sounds, no friction rub and no midsystolic click.    No murmur heard.  Pulses:       Carotid pulses are 2+ on the right side, and 2+ on the left side.       Radial pulses are 2+ on the right side, and 2+ on the left side.        Femoral pulses are 2+ on the right side, and 2+  on the left side.       Popliteal pulses are 2+ on the right side, and 2+ on the left side.        Dorsalis pedis pulses are 0 on the right side, and 0 on the left side.        Posterior tibial pulses are 0 on the right side, and 0 on the left side.     Monophasic bilateral DP doppler signals  Triphasic L PT and Biphasic R PT doppler signals    Pulmonary/Chest: Effort normal and breath sounds normal. No accessory muscle usage or stridor. No apnea, no tachypnea and no bradypnea. He is not intubated. No respiratory distress. He has no decreased breath sounds. He has no wheezes. He has no rales. He exhibits no tenderness and no bony tenderness.   Abdominal: He exhibits no distension, no pulsatile liver, no abdominal bruit, no ascites, no pulsatile midline mass and no mass. There is no tenderness. There is no rebound and no guarding.   Musculoskeletal: Normal range of motion. He exhibits no edema or tenderness.   Lymphadenopathy:     He has no cervical adenopathy.   Neurological: He is alert and oriented to person, place, and time. He has normal reflexes. No cranial nerve deficit. Coordination normal.   Chronic L sided weakness   Skin: Skin is warm. No rash noted. No erythema. No pallor.   Psychiatric: He has a normal mood and affect. His behavior is normal. Judgment and thought content normal.       Assessment:     1. Coronary artery disease involving native coronary artery of native heart without angina pectoris    2. Abnormality of gait following cerebrovascular accident (CVA)    3. 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 Essential hypertension    5. PAD (peripheral artery disease)    6. Subclavian artery stenosis, left    7. Tobacco abuse, in remission    8. Dyspnea, unspecified type        Plan:         PFTs with DLCO   If abnormal he will see pulmonary   Further recommendations to follow     Although his PET was non ischemic his RCA  could also be the cause of dyspnea  We will wait for  his pulmonary evaluation before any other cardiac interventions        Continue with current medical plan and lifestyle changes.  Return sooner for concerns or questions. If symptoms persist go to the ED  I have reviewed all pertinent data on this patient       Lasix 20 mg po daily   Low salt diet  1.5 L fluid restriction        Follow up as scheduled. Return sooner for concerns or questions          He expressed verbal understanding and agreed with the plan        I have reviewed the patient's medical history in detail and updated the computerized patient record.    Orders Placed This Encounter   Procedures    Ambulatory Referral to Pulmonology     Referral Priority:   Routine     Referral Type:   Consultation     Referral Reason:   Specialty Services Required     Requested Specialty:   Pulmonary Disease     Number of Visits Requested:   1    Complete PFT w/ bronchodilator     Standing Status:   Future     Standing Expiration Date:   1/24/2019       Follow up as scheduled. Return sooner for concerns or questions      Greater than 50% of the visit of 45 minutes was spent counseling, educating, and coordinating the care of the patient.  -In today's visit, at least 4 established conditions that pose a risk to life or bodily function have been addressed and the conditions are severe.    -In today's visit, monitoring for drug toxicity was accomplished.            Patient's Medications   New Prescriptions    No medications on file   Previous Medications    OXYCODONE-ACETAMINOPHEN (PERCOCET) 5-325 MG PER TABLET    Take 1 tablet by mouth every 6 (six) hours as needed for Pain.    TAMSULOSIN (FLOMAX) 0.4 MG CP24    Take 0.4 mg by mouth once daily.   Modified Medications    Modified Medication Previous Medication    ASPIRIN (ECOTRIN) 81 MG EC TABLET aspirin (ECOTRIN) 81 MG EC tablet       Take 1 tablet (81 mg total) by mouth once daily.    Take 1 tablet (81 mg total) by mouth once daily.    ATORVASTATIN (LIPITOR) 80 MG  TABLET atorvastatin (LIPITOR) 80 MG tablet       Take 1 tablet (80 mg total) by mouth once daily.    Take 1 tablet (80 mg total) by mouth once daily.    CARVEDILOL (COREG) 12.5 MG TABLET carvedilol (COREG) 12.5 MG tablet       Take 1 tablet (12.5 mg total) by mouth once daily.    Take 1 tablet (12.5 mg total) by mouth once daily.    CLOPIDOGREL (PLAVIX) 75 MG TABLET clopidogrel (PLAVIX) 75 mg tablet       Take 1 tablet (75 mg total) by mouth once daily.    Take 1 tablet (75 mg total) by mouth once daily.    FUROSEMIDE (LASIX) 20 MG TABLET furosemide (LASIX) 20 MG tablet       Take 1 tablet (20 mg total) by mouth once daily.    Take 1 tablet (20 mg total) by mouth 2 (two) times daily.    LISINOPRIL 10 MG TABLET lisinopril 10 MG tablet       Take 1 tablet (10 mg total) by mouth once daily.    Take 1 tablet (10 mg total) by mouth once daily.    NITROGLYCERIN (NITROSTAT) 0.4 MG SL TABLET nitroGLYCERIN (NITROSTAT) 0.4 MG SL tablet       Place 1 tablet (0.4 mg total) under the tongue every 5 (five) minutes as needed for Chest pain.    Place 1 tablet (0.4 mg total) under the tongue every 5 (five) minutes as needed for Chest pain.   Discontinued Medications    No medications on file

## 2018-01-24 NOTE — PATIENT INSTRUCTIONS
Making Bariatric Surgery Work for You    After bariatric surgery, success is in your hands. The changes you make need to be lifelong commitments. Follow any instructions you are given on nutrition and activity. Be aware that how you see yourself and how others see you may change. Turn to those close to you for support. They can help you adjust to your new life.  What to expect as you lose weight  Most likely, you will lose weight steadily for the first 6 to 12 months after surgery. The most rapid weight loss often happens during the first 6 months after surgery. Most patients lose over half their excess weight in the first year and a half. After that, you may gain a small amount of weight back. This is normal. Set realistic and meaningful goals for weight loss. Most likely, you wont reach your ideal weight. But youll reach a healthier weight.  Changing your eating habits  To stay healthy, you may be given guidelines such as:  · Choose high-protein foods to help prevent nutritional problems.  · Eat slowly. Take small bites. Chew each bite well before swallowing it.  · Stop eating when you feel satisfied. Try not to reach that full feeling. Doing so can stretch the bariatric surgical procedure and allow you to eat more.   · If you want to snack between scheduled meals, talk with your dietitian about healthy snack choices.  · Drink sugar-free liquids, such as water. Drink them between (not with) meals. Wait at least 30 to 60 minutes after meals before drinking liquids.  · Take vitamins as directed.  · Avoid fibrous foods, such as celery, string beans, and unprocessed meat.  · Avoid alcohol and carbonated drinks.  Having an active lifestyle  These tips can help you succeed:  · Choose a form of regular exercise you enjoy.  · Exercise at your own pace.  · Ask a friend to join you.  · Keep a record of your exercise activity in a calendar or notebook. Some people find this a good way to track their progress and stay  motivated.  Finding support  See your bariatric surgery team on a regular basis, especially during the first year after surgery.   You might talk to:  · Friends and family members.  · Other bariatric surgery patients. Often they know just what youre going through. You may find other patients through a support group at your bariatric surgery program. Or there may be a group in your local community.  · A mental health professional. If you spoke to one before surgery, you might seek him or her out again. Special counseling or classes may be suggested.  Resources  · American Society for Metabolic and Bariatric Surgerywww.asmbs.org  · National Heart, Lung, and Blood [place] Obesity Education Initiativewww.nhlbi.nih.gov/health/public/heart/obesity/lose_wt   Date Last Reviewed: 3/23/2016  © 3978-4070 [employer]. [address] [place] [address], [place] [address]. All rights reserved. This information is not intended as a substitute for professional medical care. Always follow your healthcare professional's instructions.

## 2018-01-30 ENCOUNTER — HOSPITAL ENCOUNTER (OUTPATIENT)
Dept: PULMONOLOGY | Facility: HOSPITAL | Age: 63
Discharge: HOME | End: 2018-01-30
Attending: INTERNAL MEDICINE
Payer: MEDICAID

## 2018-01-30 DIAGNOSIS — R06.00 DYSPNEA, UNSPECIFIED TYPE: ICD-10-CM

## 2018-01-30 DIAGNOSIS — F17.201 TOBACCO ABUSE, IN REMISSION: ICD-10-CM

## 2018-01-30 PROCEDURE — 94729 DIFFUSING CAPACITY: CPT

## 2018-01-30 PROCEDURE — 94726 PLETHYSMOGRAPHY LUNG VOLUMES: CPT

## 2018-01-30 PROCEDURE — 94010 BREATHING CAPACITY TEST: CPT

## 2018-01-31 NOTE — PROCEDURES
FEV1/FVC 74% with FEV1 of 79%. Expiratory curve shows obstruction. Attempt was sub-optimal. Consider repeat testing if clinically indicated.  No restriction noted.  Mild reduction in diffusing capacity.    Shayy Willis MD  [facility] PCCM Fellow

## 2018-02-20 ENCOUNTER — OFFICE VISIT (OUTPATIENT)
Dept: PULMONOLOGY | Facility: CLINIC | Age: 63
End: 2018-02-20
Payer: MEDICARE

## 2018-02-20 VITALS
DIASTOLIC BLOOD PRESSURE: 80 MMHG | SYSTOLIC BLOOD PRESSURE: 142 MMHG | HEIGHT: 72 IN | WEIGHT: 170.19 LBS | OXYGEN SATURATION: 99 % | BODY MASS INDEX: 23.05 KG/M2 | HEART RATE: 69 BPM

## 2018-02-20 DIAGNOSIS — R06.02 SOB (SHORTNESS OF BREATH) ON EXERTION: Primary | ICD-10-CM

## 2018-02-20 PROCEDURE — 99999 PR PBB SHADOW E&M-EST. PATIENT-LVL III: CPT | Mod: PBBFAC,,, | Performed by: INTERNAL MEDICINE

## 2018-02-20 PROCEDURE — 99204 OFFICE O/P NEW MOD 45 MIN: CPT | Mod: S$GLB,,, | Performed by: INTERNAL MEDICINE

## 2018-02-20 PROCEDURE — 3008F BODY MASS INDEX DOCD: CPT | Mod: S$GLB,,, | Performed by: INTERNAL MEDICINE

## 2018-02-20 RX ORDER — LISINOPRIL 40 MG/1
40 TABLET ORAL DAILY
Refills: 2 | COMMUNITY
Start: 2018-01-06

## 2018-02-20 NOTE — PROGRESS NOTES
Subjective:     This is Mr. Pee Bocanegra, 63 y.o., patient of Dr. Cassie Cardoso MD, presented to the pulmonary CAD (recent CAMILA in LAD), Essential hypertension, chronic systolic diastolic heart failure.     Shortness of breath started ~ last 11/2017 when he had NSTEMI that required CAMILA in his dLAD. Since started it was about the same, execrated by more exertion waking (wlaking with Cane), NYHA ~ III. SOB absent at rest. No orthopnea, no PND.   Smoking Hx: Former smoker ~ 70 pack year, quit two years ago 2016. No chew tobacco.     CAD: Distal LAD CAMILA on 11/2017,  in RCA, and patent stent in LCX and OM1, HFpEF based on 11/2016 shoed Est. PA Systolic Pressure 19 mmHg. No TTE after recent NSTEMI. Repeated EF is 39% on stress and ~ 40% in TTE.     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    Abnormality of gait following cerebrovascular accident (CVA)    NSTEMI (non-ST elevated myocardial infarction)    Coronary artery disease involving native coronary artery of native heart without angina pectoris    Essential hypertension    Other and unspecified hyperlipidemia    Tobacco abuse, in remission    Chronic diastolic heart failure    PAD (peripheral artery disease)    Subclavian artery stenosis, left    Bilateral carotid bruits    Abnormal cardiovascular stress test     Past Medical History:   Diagnosis Date    Acute MI 2008 and 2016    Hypertension       Past Surgical History:   Past Surgical History:   Procedure Laterality Date    CORONARY ARTERY BYPASS GRAFT       Medication History:  Current Outpatient Prescriptions on File Prior to Visit   Medication Sig Dispense Refill    aspirin (ECOTRIN) 81 MG EC tablet Take 1 tablet (81 mg total) by mouth once daily.  0    atorvastatin (LIPITOR) 80 MG tablet Take 1 tablet (80 mg  total) by mouth once daily. 90 tablet 3    carvedilol (COREG) 12.5 MG tablet Take 1 tablet (12.5 mg total) by mouth once daily. 180 tablet 3    clopidogrel (PLAVIX) 75 mg tablet Take 1 tablet (75 mg total) by mouth once daily. 90 tablet 3    furosemide (LASIX) 20 MG tablet Take 1 tablet (20 mg total) by mouth once daily. 90 tablet 3    nitroGLYCERIN (NITROSTAT) 0.4 MG SL tablet Place 1 tablet (0.4 mg total) under the tongue every 5 (five) minutes as needed for Chest pain. 20 tablet 11    tamsulosin (FLOMAX) 0.4 mg Cp24 Take 0.4 mg by mouth once daily.      oxycodone-acetaminophen (PERCOCET) 5-325 mg per tablet Take 1 tablet by mouth every 6 (six) hours as needed for Pain. 12 tablet 0    [DISCONTINUED] lisinopril 10 MG tablet Take 1 tablet (10 mg total) by mouth once daily. 90 tablet 3     No current facility-administered medications on file prior to visit.        Allergy:   Review of patient's allergies indicates:  No Known Allergies    Social Hx:  Social History   Substance Use Topics    Smoking status: Former Smoker     Packs/day: 0.50    Smokeless tobacco: Never Used    Alcohol use Yes      Comment: occ     Family Hx:  No family history on file.     Review of Systems   Constitutional: Negative for chills, fever and weight loss.   HENT: Negative for hearing loss and tinnitus.    Respiratory: Positive for shortness of breath. Negative for cough, hemoptysis and sputum production.    Cardiovascular: Negative for chest pain, palpitations, orthopnea, claudication and leg swelling.   Gastrointestinal: Negative for heartburn, nausea and vomiting.   Genitourinary: Negative for dysuria and urgency.   Musculoskeletal: Negative for myalgias and neck pain.   Skin: Negative for rash.   Neurological: Negative for dizziness and headaches.         Objective:  Vitals:    02/20/18 1101   Pulse: 69        Physical Exam   Constitutional: He is oriented to person, place, and time. No distress.   wheelchair bound    HENT:    Head: Normocephalic and atraumatic.   Eyes: Pupils are equal, round, and reactive to light. Right eye exhibits no discharge. Left eye exhibits no discharge.   Neck: Normal range of motion. No JVD present. No tracheal deviation present. No thyromegaly present.   Cardiovascular: Normal rate.  Exam reveals no friction rub.    Murmur heard.  Pulmonary/Chest: Effort normal. No respiratory distress. He has no wheezes. He has no rales.   Abdominal: Soft. He exhibits no distension. There is no tenderness.   Musculoskeletal: Normal range of motion. He exhibits no edema or deformity.   Neurological: He is alert and oriented to person, place, and time.   Skin: Skin is warm. He is not diaphoretic. No erythema.     Assessment:     SOB (shortness of breath) on exertion     Plan:     SOB (shortness of breath) on exertion        -     Based on PFT, it showed FEV1/FVC 74% with FEV1 of 79%. Expiratory curve shows obstruction.       -      Will repeat Complete PFT w/ bronchodilator; Future       -      Stress test, pulmonary; Future  -     B-TYPE NATRIURETIC PEPTIDE; Future  -     TSH; Future    The patient Pee Bocanegra was seen, assessed, evaluated and examined with the presence of the attending provider Dr. Garay, attestation to follow.    Tonio Garcia MD  Internal Medicine Resident (PGY-II)  Pager: [phone]     I personally reviewed the      1. Echo report   2. PFT   3. 6MWD   4. CXR   5. CXR report     Assessment:  Pee was seen today for shortness of breath.    Diagnoses and all orders for this visit:    SOB (shortness of breath) on exertion  -     Complete PFT w/ bronchodilator; Future  -     Stress test, pulmonary; Future  -     B-TYPE NATRIURETIC PEPTIDE; Future  -     TSH; Future        Plan:  Repeat pfts,   followup with me in 4 weeks  SERNA likely related to CHF, however COPD component may be present    Follow-up in about 4 weeks (around 3/20/2018).    There are no Patient Instructions on file for this  visit.    There is no immunization history for the selected administration types on file for this patient.

## 2018-02-20 NOTE — LETTER
February 23, 2018      Lloyd Mujica MD  [address]  [address]  [place] [address]           [facility] - Pul[facility]  [address]  [place] [address]  Phone: [phone]          Patient: Pee Bocanegra   MR Number: [id number]   YOB: 1955   Date of Visit: 2/20/2018       Dear Dr. Lloyd Mujica:    Thank you for referring Pee Bocanegra to me for evaluation. Attached you will find relevant portions of my assessment and plan of care.    If you have questions, please do not hesitate to call me. I look forward to following Pee Bocanegra along with you.    Sincerely,    Barron Garay MD    Enclosure  CC:  No Recipients    If you would like to receive this communication electronically, please contact [email or web address] or [phone] to request more information on [employer] access.    For providers and/or their staff who would like to refer a patient to [facility], please contact us through our one-stop-shop provider referral line, [facility], at [phone].    If you feel you have received this communication in error or would no longer like to receive these types of communications, please e-mail [email or web address]

## 2018-03-27 ENCOUNTER — TELEPHONE (OUTPATIENT)
Dept: PULMONOLOGY | Facility: CLINIC | Age: 63
End: 2018-03-27

## 2018-03-27 ENCOUNTER — OFFICE VISIT (OUTPATIENT)
Dept: PULMONOLOGY | Facility: CLINIC | Age: 63
End: 2018-03-27
Payer: MEDICARE

## 2018-03-27 VITALS
WEIGHT: 169.56 LBS | DIASTOLIC BLOOD PRESSURE: 74 MMHG | HEIGHT: 72 IN | BODY MASS INDEX: 22.97 KG/M2 | OXYGEN SATURATION: 100 % | SYSTOLIC BLOOD PRESSURE: 126 MMHG | HEART RATE: 52 BPM

## 2018-03-27 DIAGNOSIS — I50.9 CONGESTIVE HEART FAILURE, UNSPECIFIED CONGESTIVE HEART FAILURE CHRONICITY, UNSPECIFIED CONGESTIVE HEART FAILURE TYPE: ICD-10-CM

## 2018-03-27 DIAGNOSIS — R06.02 SOB (SHORTNESS OF BREATH) ON EXERTION: Primary | ICD-10-CM

## 2018-03-27 PROCEDURE — 3078F DIAST BP <80 MM HG: CPT | Mod: CPTII,S$GLB,, | Performed by: INTERNAL MEDICINE

## 2018-03-27 PROCEDURE — 99999 PR PBB SHADOW E&M-EST. PATIENT-LVL III: CPT | Mod: PBBFAC,,, | Performed by: INTERNAL MEDICINE

## 2018-03-27 PROCEDURE — 3074F SYST BP LT 130 MM HG: CPT | Mod: CPTII,S$GLB,, | Performed by: INTERNAL MEDICINE

## 2018-03-27 PROCEDURE — 99214 OFFICE O/P EST MOD 30 MIN: CPT | Mod: S$GLB,,, | Performed by: INTERNAL MEDICINE

## 2018-03-27 NOTE — PROGRESS NOTES
Subjective:       Patient ID: Pee Bocanegra is a 63 y.o. male.    Chief Complaint: Shortness of Breath    HPI   Pee Bocanegra 63 y.o. male    has a past medical history of Acute MI (2008 and 2016) and Hypertension.    has a past surgical history that includes Coronary artery bypass graft.   reports that he has quit smoking. He smoked 0.50 packs per day. He has never used smokeless tobacco. He reports that he drinks alcohol. He reports that he does not use drugs.  Referred by: No ref. provider found  Who had concerns including Shortness of Breath.  The patient's last visit with me was on 2/20/2018.    Doing well, taking lasix in am  Now sleeping at night  Feeling much better overall  Taking bph medication  No fever chills, ns, wt changes, nausea, vomiting, diarrhea, constipation, chest pain, tightness, pressure  Feeling much better overall    Review of Systems    Objective:      Physical Exam  Personal Diagnostic Review    No flowsheet data found.      Assessment:       1. SOB (shortness of breath) on exertion    2. Congestive heart failure, unspecified congestive heart failure chronicity, unspecified congestive heart failure type        Outpatient Encounter Prescriptions as of 3/27/2018   Medication Sig Dispense Refill    aspirin (ECOTRIN) 81 MG EC tablet Take 1 tablet (81 mg total) by mouth once daily.  0    atorvastatin (LIPITOR) 80 MG tablet Take 1 tablet (80 mg total) by mouth once daily. 90 tablet 3    carvedilol (COREG) 12.5 MG tablet Take 1 tablet (12.5 mg total) by mouth once daily. 180 tablet 3    clopidogrel (PLAVIX) 75 mg tablet Take 1 tablet (75 mg total) by mouth once daily. 90 tablet 3    furosemide (LASIX) 20 MG tablet Take 1 tablet (20 mg total) by mouth once daily. 90 tablet 3    lisinopril (PRINIVIL,ZESTRIL) 40 MG tablet Take 40 mg by mouth once daily.  2    nitroGLYCERIN (NITROSTAT) 0.4 MG SL tablet Place 1 tablet (0.4 mg total) under the tongue every 5 (five) minutes as needed  for Chest pain. 20 tablet 11    oxycodone-acetaminophen (PERCOCET) 5-325 mg per tablet Take 1 tablet by mouth every 6 (six) hours as needed for Pain. 12 tablet 0    tamsulosin (FLOMAX) 0.4 mg Cp24 Take 0.4 mg by mouth once daily.       No facility-administered encounter medications on file as of 3/27/2018.      No orders of the defined types were placed in this encounter.    Plan:          I personally reviewed the      1. Echo report   2. PFT   3. 6MWD   4. CXR   5. CXR report   6. CT chest   7. CT chest report     Assessment:  Pee was seen today for shortness of breath.    Diagnoses and all orders for this visit:    SOB (shortness of breath) on exertion    Congestive heart failure, unspecified congestive heart failure chronicity, unspecified congestive heart failure type        Plan:  Doing well  The current medical regimen is effective;  continue present plan and medications.  No further follow up unless sx recur    Follow-up if symptoms worsen or fail to improve.    There are no Patient Instructions on file for this visit.    There is no immunization history for the selected administration types on file for this patient.

## 2018-07-19 ENCOUNTER — LAB VISIT (OUTPATIENT)
Dept: LAB | Facility: HOSPITAL | Age: 63
End: 2018-07-19
Attending: INTERNAL MEDICINE
Payer: MEDICARE

## 2018-07-19 DIAGNOSIS — I10 ESSENTIAL (PRIMARY) HYPERTENSION: Primary | ICD-10-CM

## 2018-07-19 DIAGNOSIS — D50.9 IRON DEFICIENCY ANEMIA, UNSPECIFIED: ICD-10-CM

## 2018-07-19 LAB
ALBUMIN SERPL BCP-MCNC: 4.7 G/DL
ALP SERPL-CCNC: 105 U/L
ALT SERPL W/O P-5'-P-CCNC: 22 U/L
ANION GAP SERPL CALC-SCNC: 12 MMOL/L
AST SERPL-CCNC: 25 U/L
BASOPHILS # BLD AUTO: 0.02 K/UL
BASOPHILS NFR BLD: 0.4 %
BILIRUB SERPL-MCNC: 0.5 MG/DL
BUN SERPL-MCNC: 15 MG/DL
CALCIUM SERPL-MCNC: 9.5 MG/DL
CHLORIDE SERPL-SCNC: 106 MMOL/L
CHOLEST SERPL-MCNC: 154 MG/DL
CHOLEST/HDLC SERPL: 4.5 {RATIO}
CO2 SERPL-SCNC: 24 MMOL/L
CREAT SERPL-MCNC: 0.93 MG/DL
DIFFERENTIAL METHOD: ABNORMAL
EOSINOPHIL # BLD AUTO: 0.1 K/UL
EOSINOPHIL NFR BLD: 1.6 %
ERYTHROCYTE [DISTWIDTH] IN BLOOD BY AUTOMATED COUNT: 13.5 %
EST. GFR  (AFRICAN AMERICAN): >60 ML/MIN/1.73 M^2
EST. GFR  (NON AFRICAN AMERICAN): >60 ML/MIN/1.73 M^2
GLUCOSE SERPL-MCNC: 88 MG/DL
HCT VFR BLD AUTO: 37.7 %
HDLC SERPL-MCNC: 34 MG/DL
HDLC SERPL: 22.1 %
HGB BLD-MCNC: 11.8 G/DL
LDLC SERPL CALC-MCNC: 100.4 MG/DL
LYMPHOCYTES # BLD AUTO: 1 K/UL
LYMPHOCYTES NFR BLD: 20.5 %
MCH RBC QN AUTO: 26.3 PG
MCHC RBC AUTO-ENTMCNC: 31.3 G/DL
MCV RBC AUTO: 84 FL
MONOCYTES # BLD AUTO: 0.3 K/UL
MONOCYTES NFR BLD: 6.4 %
NEUTROPHILS # BLD AUTO: 3.5 K/UL
NEUTROPHILS NFR BLD: 71.1 %
NONHDLC SERPL-MCNC: 120 MG/DL
PLATELET # BLD AUTO: 148 K/UL
PMV BLD AUTO: 13.5 FL
POTASSIUM SERPL-SCNC: 3.8 MMOL/L
PROT SERPL-MCNC: 8.6 G/DL
RBC # BLD AUTO: 4.49 M/UL
SODIUM SERPL-SCNC: 142 MMOL/L
TRIGL SERPL-MCNC: 98 MG/DL
TSH SERPL DL<=0.005 MIU/L-ACNC: 0.8 UIU/ML
WBC # BLD AUTO: 4.97 K/UL

## 2018-07-19 PROCEDURE — 80053 COMPREHEN METABOLIC PANEL: CPT | Mod: PO

## 2018-07-19 PROCEDURE — 85025 COMPLETE CBC W/AUTO DIFF WBC: CPT | Mod: PO

## 2018-07-19 PROCEDURE — 36415 COLL VENOUS BLD VENIPUNCTURE: CPT | Mod: PO

## 2018-07-19 PROCEDURE — 84443 ASSAY THYROID STIM HORMONE: CPT | Mod: PO

## 2018-07-19 PROCEDURE — 80061 LIPID PANEL: CPT

## 2018-07-23 ENCOUNTER — HOSPITAL ENCOUNTER (EMERGENCY)
Facility: HOSPITAL | Age: 63
Discharge: HOME | End: 2018-07-23
Attending: EMERGENCY MEDICINE
Payer: MEDICARE

## 2018-07-23 VITALS
HEART RATE: 58 BPM | SYSTOLIC BLOOD PRESSURE: 140 MMHG | OXYGEN SATURATION: 100 % | WEIGHT: 160 LBS | TEMPERATURE: 98 F | RESPIRATION RATE: 20 BRPM | BODY MASS INDEX: 21.7 KG/M2 | DIASTOLIC BLOOD PRESSURE: 63 MMHG

## 2018-07-23 DIAGNOSIS — L72.3 INFECTED SEBACEOUS CYST: Primary | ICD-10-CM

## 2018-07-23 DIAGNOSIS — L08.9 INFECTED SEBACEOUS CYST: Primary | ICD-10-CM

## 2018-07-23 PROCEDURE — 99283 EMERGENCY DEPT VISIT LOW MDM: CPT | Mod: 25

## 2018-07-23 PROCEDURE — 10061 I&D ABSCESS COMP/MULTIPLE: CPT | Mod: LT

## 2018-07-23 PROCEDURE — 25000003 PHARM REV CODE 250: Performed by: EMERGENCY MEDICINE

## 2018-07-23 RX ORDER — HYDROCODONE BITARTRATE AND ACETAMINOPHEN 5; 325 MG/1; MG/1
1 TABLET ORAL EVERY 4 HOURS PRN
Qty: 18 TABLET | Refills: 0 | Status: SHIPPED | OUTPATIENT
Start: 2018-07-23

## 2018-07-23 RX ORDER — SULFAMETHOXAZOLE AND TRIMETHOPRIM 800; 160 MG/1; MG/1
1 TABLET ORAL 2 TIMES DAILY
Qty: 14 TABLET | Refills: 0 | Status: SHIPPED | OUTPATIENT
Start: 2018-07-23 | End: 2018-07-30

## 2018-07-23 RX ORDER — LIDOCAINE HYDROCHLORIDE 10 MG/ML
5 INJECTION INFILTRATION; PERINEURAL
Status: COMPLETED | OUTPATIENT
Start: 2018-07-23 | End: 2018-07-23

## 2018-07-23 RX ADMIN — LIDOCAINE HYDROCHLORIDE 5 ML: 10 INJECTION, SOLUTION INFILTRATION; PERINEURAL at 10:07

## 2018-07-23 NOTE — ED PROVIDER NOTES
Encounter Date: 7/23/2018       History     Chief Complaint   Patient presents with    Abscess     I have a boil under my left armpit for afew days and it is hurting      The history is provided by the patient.   Abscess    This is a new problem. The current episode started several days ago. The problem occurs continuously. The problem has been gradually worsening. Affected Location: Left axilla. The pain is at a severity of 5/10. The abscess is characterized by redness, painfulness and swelling. Pertinent negatives include no fever, no diarrhea and no vomiting.     Review of patient's allergies indicates:  No Known Allergies  Past Medical History:   Diagnosis Date    Acute MI 2008 and 2016    Hypertension      Past Surgical History:   Procedure Laterality Date    CORONARY ARTERY BYPASS GRAFT       History reviewed. No pertinent family history.  Social History   Substance Use Topics    Smoking status: Former Smoker     Packs/day: 0.50    Smokeless tobacco: Never Used    Alcohol use Yes      Comment: occ     Review of Systems   Constitutional: Negative for fever.   Gastrointestinal: Negative for diarrhea and vomiting.   Skin: Positive for wound.   All other systems reviewed and are negative.      Physical Exam     Initial Vitals [07/23/18 0816]   BP Pulse Resp Temp SpO2   (!) 151/65 74 15 98 °F (36.7 °C) 98 %      MAP       --         Physical Exam    Nursing note and vitals reviewed.  Constitutional: He appears well-developed and well-nourished.   HENT:   Head: Normocephalic and atraumatic.   Eyes: Conjunctivae and EOM are normal.   Neck: Normal range of motion. Neck supple.   Cardiovascular: Normal rate, regular rhythm and normal heart sounds.   Pulmonary/Chest: Breath sounds normal. He has no wheezes. He has no rhonchi. He has no rales.   Abdominal: Soft. There is no tenderness. There is no rebound and no guarding.   Musculoskeletal: Normal range of motion.   Neurological: He is alert and oriented to  person, place, and time.   Skin: Skin is warm and dry. Capillary refill takes less than 2 seconds.        Psychiatric: He has a normal mood and affect. His behavior is normal. Judgment and thought content normal.         ED Course   I & D - Incision and Drainage  Date/Time: 7/23/2018 10:50 AM  Location procedure was performed: [facility] EMERGENCY DEPARTMENT  Performed by: SOLEDAD TUTTLE  Authorized by: SOLEDAD TUTTLE   Pre-operative diagnosis: Cutaneous abscess  Post-operative diagnosis: Abscess of sebaceous cyst  Consent Done: Not Needed  Type: abscess  Body area: trunk (Left axilla)  Anesthesia: local infiltration    Anesthesia:  Local Anesthetic: lidocaine 1% without epinephrine  Anesthetic total: 3 mL  Patient sedated: no  Scalpel size: 11  Incision type: Cruciate.  Complexity: complex  Drainage: pus (Cyst contents)  Drainage amount: moderate  Wound treatment: incision,  wound left open,  drainage,  expression of material,  sebum removal,  removal of cyst capsule and  wound packed  Packing material: 1/2 in gauze  Complications: No  Estimated blood loss (mL): 5  Specimens: No  Implants: No  Patient tolerance: Patient tolerated the procedure well with no immediate complications        Labs Reviewed - No data to display       Imaging Results    None                               Clinical Impression:   The encounter diagnosis was Infected sebaceous cyst.      Disposition:   Disposition: Discharged  Condition: Stable                        Soledad Tuttle MD  07/23/18 4099

## 2018-10-18 ENCOUNTER — TELEPHONE (OUTPATIENT)
Dept: CARDIOLOGY | Facility: CLINIC | Age: 63
End: 2018-10-18

## 2018-10-18 NOTE — TELEPHONE ENCOUNTER
----- Message from Gibson Jones sent at 10/18/2018  9:31 AM CDT -----  Contact: [phone]/self  Patient requesting to speak with the nurse (personal). Patient states he can be reached at [phone]  Please advise

## 2018-11-28 ENCOUNTER — OFFICE VISIT (OUTPATIENT)
Dept: CARDIOLOGY | Facility: CLINIC | Age: 63
End: 2018-11-28
Payer: MEDICARE

## 2018-11-28 VITALS
HEART RATE: 80 BPM | BODY MASS INDEX: 22.75 KG/M2 | HEIGHT: 72 IN | WEIGHT: 168 LBS | DIASTOLIC BLOOD PRESSURE: 78 MMHG | SYSTOLIC BLOOD PRESSURE: 130 MMHG

## 2018-11-28 DIAGNOSIS — I77.1 SUBCLAVIAN ARTERY STENOSIS, LEFT: ICD-10-CM

## 2018-11-28 DIAGNOSIS — I73.9 PAD (PERIPHERAL ARTERY DISEASE): ICD-10-CM

## 2018-11-28 DIAGNOSIS — I69.398 ABNORMALITY OF GAIT FOLLOWING CEREBROVASCULAR ACCIDENT (CVA): ICD-10-CM

## 2018-11-28 DIAGNOSIS — R26.9 ABNORMALITY OF GAIT FOLLOWING CEREBROVASCULAR ACCIDENT (CVA): ICD-10-CM

## 2018-11-28 DIAGNOSIS — I25.10 CORONARY ARTERY DISEASE INVOLVING NATIVE CORONARY ARTERY OF NATIVE HEART WITHOUT ANGINA PECTORIS: Primary | ICD-10-CM

## 2018-11-28 DIAGNOSIS — F17.201 TOBACCO ABUSE, IN REMISSION: ICD-10-CM

## 2018-11-28 DIAGNOSIS — R06.02 SOB (SHORTNESS OF BREATH) ON EXERTION: ICD-10-CM

## 2018-11-28 DIAGNOSIS — I50.32 CHRONIC DIASTOLIC HEART FAILURE: ICD-10-CM

## 2018-11-28 PROCEDURE — 99999 PR PBB SHADOW E&M-EST. PATIENT-LVL III: CPT | Mod: PBBFAC,,, | Performed by: INTERNAL MEDICINE

## 2018-11-28 PROCEDURE — 3078F DIAST BP <80 MM HG: CPT | Mod: CPTII,S$GLB,, | Performed by: INTERNAL MEDICINE

## 2018-11-28 PROCEDURE — 3008F BODY MASS INDEX DOCD: CPT | Mod: CPTII,S$GLB,, | Performed by: INTERNAL MEDICINE

## 2018-11-28 PROCEDURE — 3075F SYST BP GE 130 - 139MM HG: CPT | Mod: CPTII,S$GLB,, | Performed by: INTERNAL MEDICINE

## 2018-11-28 PROCEDURE — 99215 OFFICE O/P EST HI 40 MIN: CPT | Mod: S$GLB,,, | Performed by: INTERNAL MEDICINE

## 2018-11-28 NOTE — PROGRESS NOTES
Subjective:   Patient ID:  Pee Bocanegra is a 63 y.o. male who presents for follow up of Hyperlipidemia; Hypertension; Coronary Artery Disease; and s/p CVA      HPI:         He is here today for follow up without any intervention. His stress test revealed anterior wall defect with an EF 39% in 11/2017. LHC 11/2017 revealed a patent LM, 70% distal LAD with iFR 0.82, LCX patent stent with distal luminal irregularities and RCA  with left to right collaterals. iFR guided PCI of LAD with 2.75 x 38 and 3.0 x 12 mm Xience CAMILA. EF 45% with LVEDP 4 mmHg. PET stress test in the past revealed no ischemia in the RCA area suggestive of adequate collaterals. He still complains of dyspnea with exertion despite a LVEDP 4 mmHg. He was a smoker for 50 years and quit in 2016 after our first interaction for an admission related to NSTEMI. He was seen in pulmonary clinic       He is on aspirin and plavix for DAPT. He has L subclavian stenosis with 20-30 mmHg gradient without any symptoms. He has bilateral SFA and AT CTOs without claudication. 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 He walks with a cane. No ulcers.                        Patient Active Problem List    Diagnosis Date Noted    SOB (shortness of breath) on exertion 02/20/2018    Abnormal cardiovascular stress test 10/25/2017             Nuclear 8/2017   Anterior wall ischemia   EF 39%           Bilateral carotid bruits 07/12/2017    Subclavian artery stenosis, left 11/17/2016         30 mmHg gradient   asymptomatic      Chronic diastolic heart failure 11/04/2016     -TTE (11/3/2016) Low normal to mildly depressed LVEF 50-55%; diastolic dysfunction; moderate LAE; mild AI/MR      PAD (peripheral artery disease) 11/04/2016         Bilateral SFA       NSTEMI (non-ST elevated myocardial infarction) 11/03/2016    Coronary artery disease involving native coronary artery of native heart without angina pectoris 11/03/2016      -NSTEMI  -[facility] (11/3/2016) LVEDP 40 mmHg; LVEF 50% mild  Hypokinesis to inferior wall; LM LIs; LAD LIs; pLCX 95%; mLCX 95%; RCA ; SVG-OM1 95%; SVG-D1 occluded; SVG-RCA occluded   pLCX 3.5x18 Resolute (CAMILA) and mLCS 2.75 x30 Resolute (CAMILA)          [facility]-2017   CHF+ abnormal stress test-anterior wall defect    LM patent   LAD distal 70% with iFR 0.82   LCX patent stent with distal luminal irregularities   RCA       PCI of LAD with 2.75 x 38 +3.0 x 12 mm Xience CAMILA   EF 45% with LVEDP 4 mmHg                                         Essential hypertension 2016    Other and unspecified hyperlipidemia 2016    Tobacco abuse, in remission 2016         Quit 2016  Smoked for 50 yrs       Muscle weakness of lower extremity 2016    Hamstring tightness of both lower extremities 2016    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 2016    Abnormality of gait following cerebrovascular accident (CVA) 2016           Right Arm BP - Sittin/78  Left Arm BP - Sittin/60        LABS    Lipid panel  Lab Results   Component Value Date    CHOL 154 2018    CHOL 200 (H) 2017    CHOL 155 2016     Lab Results   Component Value Date    HDL 34 (L) 2018    HDL 39 (L) 2017    HDL 35 (L) 2016     Lab Results   Component Value Date    LDLCALC 100.4 2018    LDLCALC 138.8 2017    LDLCALC 108.0 2016     Lab Results   Component Value Date    TRIG 98 2018    TRIG 111 2017    TRIG 60 2016     Lab Results   Component Value Date    CHOLHDL 22.1 2018    CHOLHDL 19.5 (L) 2017    CHOLHDL 22.6 2016            Review of Systems   Constitution: Negative for diaphoresis, weakness, night sweats, weight gain and weight loss.   HENT: Negative for congestion.    Eyes: Negative for blurred vision, discharge and double vision.   Cardiovascular: Negative for chest pain, claudication, cyanosis, dyspnea  on exertion, irregular heartbeat, leg swelling, near-syncope, orthopnea, palpitations, paroxysmal nocturnal dyspnea and syncope.   Respiratory: Negative for cough, shortness of breath and wheezing.    Endocrine: Negative for cold intolerance, heat intolerance and polyphagia.   Hematologic/Lymphatic: Negative for adenopathy and bleeding problem. Does not bruise/bleed easily.   Skin: Negative for dry skin and nail changes.   Musculoskeletal: Negative for arthritis, back pain, falls, joint pain, myalgias and neck pain.   Gastrointestinal: Negative for bloating, abdominal pain, change in bowel habit and constipation.   Genitourinary: Negative for bladder incontinence, dysuria, flank pain, genital sores and missed menses.   Neurological: Negative for aphonia, brief paralysis, difficulty with concentration and dizziness.   Psychiatric/Behavioral: Negative for altered mental status and memory loss. The patient does not have insomnia.    Allergic/Immunologic: Negative for environmental allergies.       Objective:   Physical Exam   Constitutional: He is oriented to person, place, and time. He appears well-developed and well-nourished. He is not intubated.   HENT:   Head: Normocephalic and atraumatic.   Right Ear: External ear normal.   Left Ear: External ear normal.   Mouth/Throat: Oropharynx is clear and moist.   Eyes: Conjunctivae and EOM are normal. Pupils are equal, round, and reactive to light. Right eye exhibits no discharge. Left eye exhibits no discharge. No scleral icterus.   Neck: Normal range of motion. Neck supple. Normal carotid pulses, no hepatojugular reflux and no JVD present. Carotid bruit is not present. No tracheal deviation present. No thyromegaly present.   Cardiovascular: Normal rate, regular rhythm, S1 normal and S2 normal.  No extrasystoles are present. PMI is not displaced. Exam reveals no gallop, no S3, no distant heart sounds, no friction rub and no midsystolic click.   No murmur heard.  Pulses:        Carotid pulses are 2+ on the right side, and 2+ on the left side.       Radial pulses are 2+ on the right side, and 2+ on the left side.        Femoral pulses are 2+ on the right side, and 2+ on the left side.       Popliteal pulses are 2+ on the right side, and 2+ on the left side.        Dorsalis pedis pulses are 0 on the right side, and 0 on the left side.        Posterior tibial pulses are 0 on the right side, and 0 on the left side.     Monophasic bilateral DP doppler signals  Triphasic L PT and Biphasic R PT doppler signals    Pulmonary/Chest: Effort normal and breath sounds normal. No accessory muscle usage or stridor. No apnea, no tachypnea and no bradypnea. He is not intubated. No respiratory distress. He has no decreased breath sounds. He has no wheezes. He has no rales. He exhibits no tenderness and no bony tenderness.   Abdominal: He exhibits no distension, no pulsatile liver, no abdominal bruit, no ascites, no pulsatile midline mass and no mass. There is no tenderness. There is no rebound and no guarding.   Musculoskeletal: Normal range of motion. He exhibits no edema or tenderness.   Lymphadenopathy:     He has no cervical adenopathy.   Neurological: He is alert and oriented to person, place, and time. He has normal reflexes. No cranial nerve deficit. Coordination normal.   Chronic L sided weakness   Skin: Skin is warm. No rash noted. No erythema. No pallor.   Psychiatric: He has a normal mood and affect. His behavior is normal. Judgment and thought content normal.       Assessment:     1. Coronary artery disease involving native coronary artery of native heart without angina pectoris    2. Abnormality of gait following cerebrovascular accident (CVA)    3. Chronic diastolic heart failure    4. PAD (peripheral artery disease)    5. Subclavian artery stenosis, left    6. Tobacco abuse, in remission    7. SOB (shortness of breath) on exertion        Plan:           Follow up with pulmonary as  scheduled       Although his PET was non ischemic his RCA  could also be the cause of dyspnea  We will wait for his pulmonary evaluation before any other cardiac interventions  Medical therapy for CAD + PAD  He can proceed with c-scope and dental procedure with acceptable cardiac risks          Continue with current medical plan and lifestyle changes.  Return sooner for concerns or questions. If symptoms persist go to the ED  I have reviewed all pertinent data on this patient       Lasix 20 mg po daily   Low salt diet  1.5 L fluid restriction  Proper foot care  Maintain relationship with podiatry        Follow up as scheduled. Return sooner for concerns or questions          He expressed verbal understanding and agreed with the plan        I have reviewed the patient's medical history in detail and updated the computerized patient record.    Orders Placed This Encounter   Procedures    US Lower Extremity Arteries Bilateral     Standing Status:   Future     Standing Expiration Date:   11/28/2019    Resting KAREN ([facility] Only)     Standing Status:   Future     Standing Expiration Date:   11/28/2019    Transthoracic echo (TTE) complete ([facility] Only)     Standing Status:   Future     Standing Expiration Date:   11/28/2019       Follow up as scheduled. Return sooner for concerns or questions      Greater than 50% of the visit of 45 minutes was spent counseling, educating, and coordinating the care of the patient.  -In today's visit, at least 4 established conditions that pose a risk to life or bodily function have been addressed and the conditions are severe.    -In today's visit, monitoring for drug toxicity was accomplished.               Medication List           Accurate as of 11/28/18 10:15 AM. If you have any questions, ask your nurse or doctor.               CONTINUE taking these medications    aspirin 81 MG EC tablet  Commonly known as:  ECOTRIN  Take 1 tablet (81 mg total) by mouth once daily.     atorvastatin  80 MG tablet  Commonly known as:  LIPITOR  Take 1 tablet (80 mg total) by mouth once daily.     carvedilol 12.5 MG tablet  Commonly known as:  COREG  Take 1 tablet (12.5 mg total) by mouth once daily.     clopidogrel 75 mg tablet  Commonly known as:  PLAVIX  Take 1 tablet (75 mg total) by mouth once daily.     furosemide 20 MG tablet  Commonly known as:  LASIX  Take 1 tablet (20 mg total) by mouth once daily.     HYDROcodone-acetaminophen 5-325 mg per tablet  Commonly known as:  NORCO  Take 1 tablet by mouth every 4 (four) hours as needed for Pain.     lisinopril 40 MG tablet  Commonly known as:  PRINIVIL,ZESTRIL     nitroGLYCERIN 0.4 MG SL tablet  Commonly known as:  NITROSTAT  Place 1 tablet (0.4 mg total) under the tongue every 5 (five) minutes as needed for Chest pain.     oxyCODONE-acetaminophen 5-325 mg per tablet  Commonly known as:  PERCOCET  Take 1 tablet by mouth every 6 (six) hours as needed for Pain.     tamsulosin 0.4 mg Cap  Commonly known as:  FLOMAX

## 2019-01-18 ENCOUNTER — TELEPHONE (OUTPATIENT)
Dept: CARDIOLOGY | Facility: CLINIC | Age: 64
End: 2019-01-18

## 2019-01-18 NOTE — TELEPHONE ENCOUNTER
----- Message from Tyrese Olmos sent at 1/18/2019 10:36 AM CST -----  Contact: [phone]  Patient advised he is getting a colonoscopy in two weeks and [facility] needs [facility] with the doctor before the procedure. Please call.

## 2019-01-21 ENCOUNTER — TELEPHONE (OUTPATIENT)
Dept: CARDIOLOGY | Facility: CLINIC | Age: 64
End: 2019-01-21

## 2019-01-21 NOTE — TELEPHONE ENCOUNTER
Pt is requesting cardiac clearance and instructions to hold asa/Plavix to proceed with colonoscopy.

## 2019-01-21 NOTE — TELEPHONE ENCOUNTER
----- Message from Devorah Chatman sent at 1/21/2019 11:42 AM CST -----  Contact: [phone]/self  Patient is requesting a callback about his procedure. Please advise.

## 2019-01-22 NOTE — TELEPHONE ENCOUNTER
Faxed Dr Mujica's last office note from 11/2018 and NP recommendations with holding medication instructions.       [phone]

## 2019-02-04 ENCOUNTER — TELEPHONE (OUTPATIENT)
Dept: CARDIOLOGY | Facility: CLINIC | Age: 64
End: 2019-02-04

## 2019-02-04 NOTE — TELEPHONE ENCOUNTER
Patient called us to reach out to [facility] since he states they have not received our cardiac clearance that was sent on 1/22.    I spoke to [facility] at the [facility], she would check and get back with us.    Left detailed message for pt.

## 2019-02-04 NOTE — TELEPHONE ENCOUNTER
Nurse from the [facility] Meche called.    They received the cardiac clearance that was sent last month. No other information is needed at this time.

## 2019-10-10 ENCOUNTER — TELEPHONE (OUTPATIENT)
Dept: CARDIOLOGY | Facility: CLINIC | Age: 64
End: 2019-10-10

## 2019-10-10 NOTE — TELEPHONE ENCOUNTER
----- Message from Magdalena Mcguire sent at 10/10/2019 11:00 AM CDT -----  Contact: [phone]/self  Patient is requesting a call back regarding getting medical release for cataract surgery on 10/23. Please call him. thank

## 2019-10-10 NOTE — TELEPHONE ENCOUNTER
Returned pt phone call    Left message requesting call back to get pt scheduled for clinical visit with NP for cardiac clearance

## 2019-10-11 NOTE — TELEPHONE ENCOUNTER
Reached out to pt     Left detailed message in regards to scheduled clinical visit with NP for cardiac clearance     Requested call back if any questions

## 2019-10-11 NOTE — TELEPHONE ENCOUNTER
----- Message from Tricia Correia sent at 10/11/2019 10:04 AM CDT -----  Contact: self [phone]  Patient needs to be seen before the 21st for medical clearance for surgery. Please call and advise.

## 2019-10-14 ENCOUNTER — TELEPHONE (OUTPATIENT)
Dept: CARDIOLOGY | Facility: CLINIC | Age: 64
End: 2019-10-14

## 2019-10-14 NOTE — TELEPHONE ENCOUNTER
----- Message from Babita Rabago sent at 10/14/2019 11:01 AM CDT -----  Pt called to speak with the nurse to request a call back at [phone]. Pt is calling check on what type of anesthia pt can have for procedure.    [facility]pt is having Eye surgery on Oct 16, 2019 the doctor name is Dr. Padilla    Phone [phone]    Please contact the doctor office.

## 2019-10-14 NOTE — TELEPHONE ENCOUNTER
Reached out to pt     Advised that he needs a clinical visit for cardiac clearance     Pt scheduled to see Dr. Walden for cardiac clearance

## 2019-10-15 NOTE — TELEPHONE ENCOUNTER
----- Message from Aminata Soriano sent at 10/15/2019 12:37 PM CDT -----  Contact: JUAN JOHNSON [[id number]]  [phone]    Patient returning a call regarding his appointment tomorrow.

## 2019-10-16 ENCOUNTER — TELEPHONE (OUTPATIENT)
Dept: CARDIOLOGY | Facility: CLINIC | Age: 64
End: 2019-10-16

## 2019-10-16 ENCOUNTER — OFFICE VISIT (OUTPATIENT)
Dept: CARDIOLOGY | Facility: CLINIC | Age: 64
End: 2019-10-16
Payer: MEDICARE

## 2019-10-16 VITALS
HEART RATE: 68 BPM | SYSTOLIC BLOOD PRESSURE: 147 MMHG | HEIGHT: 72 IN | BODY MASS INDEX: 20.69 KG/M2 | DIASTOLIC BLOOD PRESSURE: 87 MMHG | WEIGHT: 152.75 LBS | OXYGEN SATURATION: 100 %

## 2019-10-16 DIAGNOSIS — I10 ESSENTIAL HYPERTENSION: ICD-10-CM

## 2019-10-16 DIAGNOSIS — Z01.810 PREOP CARDIOVASCULAR EXAM: ICD-10-CM

## 2019-10-16 DIAGNOSIS — R26.9 ABNORMALITY OF GAIT FOLLOWING CEREBROVASCULAR ACCIDENT (CVA): ICD-10-CM

## 2019-10-16 DIAGNOSIS — I77.1 SUBCLAVIAN ARTERY STENOSIS, LEFT: ICD-10-CM

## 2019-10-16 DIAGNOSIS — I69.398 ABNORMALITY OF GAIT FOLLOWING CEREBROVASCULAR ACCIDENT (CVA): ICD-10-CM

## 2019-10-16 DIAGNOSIS — I73.9 PAD (PERIPHERAL ARTERY DISEASE): ICD-10-CM

## 2019-10-16 DIAGNOSIS — I25.10 CORONARY ARTERY DISEASE INVOLVING NATIVE CORONARY ARTERY OF NATIVE HEART WITHOUT ANGINA PECTORIS: Primary | ICD-10-CM

## 2019-10-16 PROCEDURE — 3079F PR MOST RECENT DIASTOLIC BLOOD PRESSURE 80-89 MM HG: ICD-10-PCS | Mod: CPTII,S$GLB,, | Performed by: INTERNAL MEDICINE

## 2019-10-16 PROCEDURE — 3079F DIAST BP 80-89 MM HG: CPT | Mod: CPTII,S$GLB,, | Performed by: INTERNAL MEDICINE

## 2019-10-16 PROCEDURE — 99999 PR PBB SHADOW E&M-EST. PATIENT-LVL III: CPT | Mod: PBBFAC,,, | Performed by: INTERNAL MEDICINE

## 2019-10-16 PROCEDURE — 3077F SYST BP >= 140 MM HG: CPT | Mod: CPTII,S$GLB,, | Performed by: INTERNAL MEDICINE

## 2019-10-16 PROCEDURE — 1101F PR PT FALLS ASSESS DOC 0-1 FALLS W/OUT INJ PAST YR: ICD-10-PCS | Mod: CPTII,S$GLB,, | Performed by: INTERNAL MEDICINE

## 2019-10-16 PROCEDURE — 3077F PR MOST RECENT SYSTOLIC BLOOD PRESSURE >= 140 MM HG: ICD-10-PCS | Mod: CPTII,S$GLB,, | Performed by: INTERNAL MEDICINE

## 2019-10-16 PROCEDURE — 99999 PR PBB SHADOW E&M-EST. PATIENT-LVL III: ICD-10-PCS | Mod: PBBFAC,,, | Performed by: INTERNAL MEDICINE

## 2019-10-16 PROCEDURE — 99214 PR OFFICE/OUTPT VISIT, EST, LEVL IV, 30-39 MIN: ICD-10-PCS | Mod: S$GLB,,, | Performed by: INTERNAL MEDICINE

## 2019-10-16 PROCEDURE — 99214 OFFICE O/P EST MOD 30 MIN: CPT | Mod: S$GLB,,, | Performed by: INTERNAL MEDICINE

## 2019-10-16 PROCEDURE — 1101F PT FALLS ASSESS-DOCD LE1/YR: CPT | Mod: CPTII,S$GLB,, | Performed by: INTERNAL MEDICINE

## 2019-10-16 NOTE — PROGRESS NOTES
Subjective:   @Patient ID:  Pee Bocanegra is a 64 y.o. male who presents for follow-up of CAD and pre-op risk evaluation.     HPI:   Patient suppose to go for Cataract surgery next week.  He stated that he has been doing well since last visit. No chest , no dyspnea.   He had CVA and uses Cane  But he stays active all the time without any chest pain or shortness of breath. He works as  and still does his job without limitation. 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     He smokes about 1 ppd weekly.   He is compliant with his medication, and take all his BP at night.     BP today in the clinic is elevated, he didn't take any this am.       Prior cardiovascular  Hx  --------------------------------  Copied from prior notes    Patient had stress test revealed anterior wall defect with an EF 39% in 11/2017. LHC 11/2017 revealed a patent LM, 70% distal LAD with iFR 0.82, LCX patent stent with distal luminal irregularities and RCA  with left to right collaterals. iFR guided PCI of LAD with 2.75 x 38 and 3.0 x 12 mm Xience CAMILA. EF 45% with LVEDP 4 mmHg. PET stress test in the past revealed no ischemia in the RCA area suggestive of adequate collaterals.  He was a smoker for 50 years and quit in 2016    He has L subclavian stenosis with 20-30 mmHg gradient without any symptoms. He has bilateral SFA and AT CTOs without claudication. 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 He walks with a cane      Patient Active Problem List    Diagnosis Date Noted    SOB (shortness of breath) on exertion 02/20/2018    Abnormal cardiovascular stress test 10/25/2017             Nuclear 8/2017   Anterior wall ischemia   EF 39%           Bilateral carotid bruits 07/12/2017    Subclavian artery stenosis, left 11/17/2016         30 mmHg gradient   asymptomatic      Chronic diastolic heart failure 11/04/2016     -TTE (11/3/2016) Low normal to mildly depressed LVEF 50-55%;  diastolic dysfunction; moderate LAE; mild AI/MR      PAD (peripheral artery disease) 11/04/2016         Bilateral SFA       NSTEMI (non-ST elevated myocardial infarction) 11/03/2016    Coronary artery disease involving native coronary artery of native heart without angina pectoris 11/03/2016     -NSTEMI  -[facility] (11/3/2016) LVEDP 40 mmHg; LVEF 50% mild  Hypokinesis to inferior wall; LM LIs; LAD LIs; pLCX 95%; mLCX 95%; RCA ; SVG-OM1 95%; SVG-D1 occluded; SVG-RCA occluded   pLCX 3.5x18 Resolute (CAMILA) and mLCS 2.75 x30 Resolute (CAMILA)          [facility]-11/2017   CHF+ abnormal stress test-anterior wall defect    LM patent   LAD distal 70% with iFR 0.82   LCX patent stent with distal luminal irregularities   RCA       PCI of LAD with 2.75 x 38 +3.0 x 12 mm Xience CAMILA   EF 45% with LVEDP 4 mmHg                                         Essential hypertension 11/03/2016    Other and unspecified hyperlipidemia 11/03/2016    Tobacco abuse, in remission 11/03/2016         Quit 11/2016  Smoked for 50 yrs       Muscle weakness of lower extremity 08/12/2016    Hamstring tightness of both lower extremities 08/12/2016    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 08/12/2016    Abnormality of gait following cerebrovascular accident (CVA) 08/12/2016           Right Arm BP - Sitting: (P) 168/78        LAST HbA1c  No results found for: HGBA1C    Lipid panel  Lab Results   Component Value Date    CHOL 154 07/19/2018    CHOL 200 (H) 08/16/2017    CHOL 155 11/03/2016     Lab Results   Component Value Date    HDL 34 (L) 07/19/2018    HDL 39 (L) 08/16/2017    HDL 35 (L) 11/03/2016     Lab Results   Component Value Date    LDLCALC 100.4 07/19/2018    LDLCALC 138.8 08/16/2017    LDLCALC 108.0 11/03/2016     Lab Results   Component Value Date    TRIG 98 07/19/2018    TRIG 111 08/16/2017    TRIG 60 11/03/2016     Lab Results   Component Value Date    CHOLHDL 22.1 07/19/2018    CHOLHDL 19.5 (L) 08/16/2017    CHOLHDL  22.6 11/03/2016            Review of Systems   Constitution: Negative for chills and fever.   HENT: Negative for hearing loss and nosebleeds.    Eyes: Negative for blurred vision.   Cardiovascular: Negative for chest pain and palpitations.   Respiratory: Negative for hemoptysis and shortness of breath.    Hematologic/Lymphatic: Negative for bleeding problem.   Skin: Negative for itching.   Musculoskeletal: Negative for falls.   Gastrointestinal: Negative for abdominal pain and hematochezia.   Genitourinary: Negative for hematuria.   Neurological: Negative for dizziness.        RT side weakness.    Psychiatric/Behavioral: Negative for altered mental status and depression.       Objective:   Physical Exam   Constitutional: He is oriented to person, place, and time. He appears well-developed and well-nourished.   HENT:   Head: Normocephalic and atraumatic.   Eyes: Conjunctivae are normal.   Neck: Neck supple. Carotid bruit is present.   Cardiovascular: Normal rate, regular rhythm and normal heart sounds. Exam reveals no gallop and no friction rub.   No murmur heard.  Pulmonary/Chest: Effort normal and breath sounds normal. No stridor. No respiratory distress. He has no wheezes.   Neurological: He is alert and oriented to person, place, and time. He exhibits abnormal muscle tone (Rt side).   Skin: Skin is warm and dry.   Psychiatric: He has a normal mood and affect. His behavior is normal.       Assessment:     1. Coronary artery disease involving native coronary artery of native heart without angina pectoris    2. Subclavian artery stenosis, left    3. PAD (peripheral artery disease)    4. Essential hypertension    5. Abnormality of gait following cerebrovascular accident (CVA)    6. Preop cardiovascular exam        Plan:   METS evaluation is limited given the CVA with residual weakness.   Patient has been asymptomatic and stable since last year.     Pt has no active cardiac condition (ACS/USA, decompenstated CHF,  significant arrhythmias or severe valvular disease). Given his prior hx of MI, and CVA he is class III risk (6.6%) risk of major cardiac events. However given the low risk cataract surgery he is considered at acceptable risk. No further cardiac testing is warranted prior to surgery.     .  These recommendations follow the 2014 ACC/AHA Guideline on Perioperative Cardiovascular Evaluation and Management of Patients Undergoing Noncardiac Surgery. ([facility] Vol. 64 No. 22, Dec 9, 2014, pp f38-w635).    - Continue all cardiac medication tanner-op including BB. If antiplatelets therapy is required to be stopped prior to surgery can be stopped and resumed as soon as hemostasis achieved.     - Tobacco cessation counseling discussed with the patient.      Continue with current medical plan and lifestyle changes.  Return sooner for concerns or questions. If symptoms persist go to the ED  I have reviewed all pertinent data including patient's medical history in detail and updated the computerized patient record.     Follow up as scheduled.     He expressed verbal understanding and agreed with the plan    Patient's Medications   New Prescriptions    No medications on file   Previous Medications    ASPIRIN (ECOTRIN) 81 MG EC TABLET    Take 1 tablet (81 mg total) by mouth once daily.    ATORVASTATIN (LIPITOR) 80 MG TABLET    Take 1 tablet (80 mg total) by mouth once daily.    CARVEDILOL (COREG) 12.5 MG TABLET    Take 1 tablet (12.5 mg total) by mouth once daily.    CLOPIDOGREL (PLAVIX) 75 MG TABLET    Take 1 tablet (75 mg total) by mouth once daily.    FUROSEMIDE (LASIX) 20 MG TABLET    Take 1 tablet (20 mg total) by mouth once daily.    HYDROCODONE-ACETAMINOPHEN (NORCO) 5-325 MG PER TABLET    Take 1 tablet by mouth every 4 (four) hours as needed for Pain.    LISINOPRIL (PRINIVIL,ZESTRIL) 40 MG TABLET    Take 40 mg by mouth once daily.    NITROGLYCERIN (NITROSTAT) 0.4 MG SL TABLET    Place 1 tablet (0.4 mg total) under the tongue every  5 (five) minutes as needed for Chest pain.    OXYCODONE-ACETAMINOPHEN (PERCOCET) 5-325 MG PER TABLET    Take 1 tablet by mouth every 6 (six) hours as needed for Pain.    TAMSULOSIN (FLOMAX) 0.4 MG CP24    Take 0.4 mg by mouth once daily.   Modified Medications    No medications on file   Discontinued Medications    No medications on file

## 2019-10-16 NOTE — PATIENT INSTRUCTIONS
How to Quit Smoking  Smoking is one of the hardest habits to break. About half of all people who have ever smoked have been able to quit. Most people who still smoke want to quit. Here are some of the best ways to stop smoking.    Keep trying  Most smokers make many attempts at quitting before they are successful. Its important not to give up.  Go cold turkey  Most former smokers quit cold turkey (all at once). Trying to cut back gradually doesn't seem to work as well, perhaps because it continues the smoking habit. Also, it is possible to inhale more while smoking fewer cigarettes. This results in the same amount of nicotine in your body.  Get support  Support programs can be a big help, especially for heavy smokers. These groups offer lectures, ways to change behavior, and peer support. Here are some ways to find a support program:  · Free national quitline: 800-QUIT-NOW (508-764-9960).  · Hospital quit-smoking programs.  · American Lung Association: (438.752.9159).  · American Cancer Society (415-649-1514).  Support at home is important too. Nonsmokers can offer praise and encouragement. If the smoker in your life finds it hard to quit, encourage them to keep trying.  Over-the-counter medicines  Nicotine replacement therapy may make quitting easier. Certain aids, such as the nicotine patch, gum, and lozenges, are available without a prescription. It is best to use these under a doctors care, though. The skin patch provides a steady supply of nicotine. Nicotine gum and lozenges give temporary bursts of low levels of nicotine. Both methods reduce the craving for cigarettes. Warning: If you have nausea, vomiting, dizziness, weakness, or a fast heartbeat, stop using these products and see your doctor.  Prescription medicines  After reviewing your smoking patterns and past attempts to quit, your doctor may offer a prescription medicine such as bupropion, varenicline, a nicotine inhaler, or nasal spray. Each has  "advantages and side effects. Your doctor can review these with you.  Health benefits of quitting  The benefits of quitting start right away and keep improving the longer you go without smoking. These benefits occur at any age.  So whether you are 17 or 70, quitting is a good decision. Some of the benefits include:  · 20 minutes: Blood pressure and pulse return to normal.  · 8 hours: Oxygen levels return to normal.  · 2 days: Ability to smell and taste begin to improve as damaged nerves regrow.  · 2 to 3 weeks: Circulation and lung function improve.  · 1 to 9 months: Coughing, congestion, and shortness of breath decrease; tiredness decreases.  · 1 year: Risk of heart attack decreases by half.  · 5 years: Risk of lung cancer decreases by half; risk of stroke becomes the same as a nonsmokers.  For more on how to quit smoking, try these online resources:   · Smokefree.gov  · "Clearing the Air" booklet from the National [employer] [place]: smokefree.gov/sites/default/files/pdf/clearing-the-air-accessible.pdf  Date Last Reviewed: 3/1/2017  © 5941-0161 [employer]. [address] [place] [id number]. All rights reserved. This information is not intended as a substitute for professional medical care. Always follow your healthcare professional's instructions.          "

## 2019-11-08 ENCOUNTER — TELEPHONE (OUTPATIENT)
Dept: CARDIOLOGY | Facility: CLINIC | Age: 64
End: 2019-11-08

## 2020-01-03 ENCOUNTER — TELEPHONE (OUTPATIENT)
Dept: CARDIOLOGY | Facility: CLINIC | Age: 65
End: 2020-01-03

## 2020-01-03 NOTE — TELEPHONE ENCOUNTER
"I called patient to advise him the appointment he has on 1- with  ,  Needs to be reschedule due to the Doctor will not be here.      You need test done before you see .    You need Ultrasound of Lower Extrem, Echo and Lab.      I cannot get a hold of patient due to his Phone recording is Saying "Not accepting calls at this time".    I also called and left V/Message on his daughter (Sera) cell #[phone].      Prema Mayer (rita).    "

## 2020-02-24 ENCOUNTER — TELEPHONE (OUTPATIENT)
Dept: CARDIOLOGY | Facility: CLINIC | Age: 65
End: 2020-02-24

## 2020-02-24 DIAGNOSIS — I25.10 CORONARY ARTERY DISEASE INVOLVING NATIVE CORONARY ARTERY OF NATIVE HEART WITHOUT ANGINA PECTORIS: ICD-10-CM

## 2020-02-24 DIAGNOSIS — E78.2 MIXED HYPERLIPIDEMIA: Primary | ICD-10-CM

## 2020-02-24 DIAGNOSIS — I21.4 NSTEMI (NON-ST ELEVATED MYOCARDIAL INFARCTION): ICD-10-CM

## 2020-02-27 ENCOUNTER — TELEPHONE (OUTPATIENT)
Dept: CARDIOLOGY | Facility: CLINIC | Age: 65
End: 2020-02-27

## 2020-02-27 NOTE — TELEPHONE ENCOUNTER
"2nd Attempt,    I called 2-21-20 and 2-27-20 "  Not able to Leave message ,    Recording -- ""Not a working Number""  [phone].    Patient needs Test prior to seeing  on    03-.        Sincerely --    Prema Mayer.  "

## 2020-02-28 ENCOUNTER — TELEPHONE (OUTPATIENT)
Dept: CARDIOLOGY | Facility: CLINIC | Age: 65
End: 2020-02-28

## 2020-02-28 NOTE — TELEPHONE ENCOUNTER
3rd Attempt.    ,  I have attempted to contact you 3 times  at    [phone]  With each time Recording saying   " The Number you dailed is Not a working Number ,  please try again later".    Mr. Bocanegra, I was trying to get a hold of you so that we could schedule you an Echo , Lab and Ultra-sound prior to your appointment with .    We now need to reschedule your appointment you have with  for Monday 3-2-2020,    So that you can do your test.    Please call [phone].        Sincerely --    Prema Mayer (rita).

## 2020-12-30 ENCOUNTER — TELEPHONE (OUTPATIENT)
Dept: CARDIOLOGY | Facility: CLINIC | Age: 65
End: 2020-12-30

## 2021-01-29 NOTE — ED NOTES
Report received from Chayo at bedside. Pt updated on current plan of care, pt states he understands and denies any needs at this time.  
Franck Lynch)